# Patient Record
Sex: FEMALE | Race: WHITE | NOT HISPANIC OR LATINO | Employment: FULL TIME | URBAN - METROPOLITAN AREA
[De-identification: names, ages, dates, MRNs, and addresses within clinical notes are randomized per-mention and may not be internally consistent; named-entity substitution may affect disease eponyms.]

---

## 2017-10-25 ENCOUNTER — HOSPITAL ENCOUNTER (OUTPATIENT)
Dept: FAMILY MEDICINE CLINIC | Facility: CLINIC | Age: 57
Setting detail: SPECIMEN
Discharge: HOME | End: 2017-10-25
Attending: FAMILY MEDICINE | Admitting: FAMILY MEDICINE

## 2017-10-25 LAB
ALBUMIN SERPL-MCNC: 4.3 G/DL (ref 3.5–4.8)
ALBUMIN/GLOB SERPL: 1.5 {RATIO} (ref 1–1.7)
ALP SERPL-CCNC: 112 IU/L (ref 32–91)
ALT SERPL-CCNC: 45 IU/L (ref 14–54)
ANION GAP SERPL CALC-SCNC: 12.8 MMOL/L (ref 10–20)
AST SERPL-CCNC: 55 IU/L (ref 15–41)
BILIRUB SERPL-MCNC: 0.4 MG/DL (ref 0.3–1.2)
BUN SERPL-MCNC: 11 MG/DL (ref 8–20)
BUN/CREAT SERPL: 15.7 (ref 5.4–26.2)
CALCIUM SERPL-MCNC: 9.7 MG/DL (ref 8.9–10.3)
CHLORIDE SERPL-SCNC: 103 MMOL/L (ref 101–111)
CHOLEST SERPL-MCNC: 149 MG/DL
CHOLEST/HDLC SERPL: 3.2 {RATIO}
CONV CO2: 27 MMOL/L (ref 22–32)
CONV LDL CHOLESTEROL DIRECT: 87 MG/DL (ref 0–100)
CONV TOTAL PROTEIN: 7.2 G/DL (ref 6.1–7.9)
CREAT UR-MCNC: 0.7 MG/DL (ref 0.4–1)
GLOBULIN UR ELPH-MCNC: 2.9 G/DL (ref 2.5–3.8)
GLUCOSE SERPL-MCNC: 120 MG/DL (ref 65–99)
HDLC SERPL-MCNC: 47 MG/DL
LDLC/HDLC SERPL: 1.8 {RATIO}
LIPID INTERPRETATION: NORMAL
POTASSIUM SERPL-SCNC: 4.8 MMOL/L (ref 3.6–5.1)
SODIUM SERPL-SCNC: 138 MMOL/L (ref 136–144)
TRIGL SERPL-MCNC: 98 MG/DL
TSH SERPL-ACNC: 2.46 UIU/ML (ref 0.34–5.6)
VLDLC SERPL CALC-MCNC: 15.7 MG/DL

## 2018-04-25 ENCOUNTER — HOSPITAL ENCOUNTER (OUTPATIENT)
Dept: FAMILY MEDICINE CLINIC | Facility: CLINIC | Age: 58
Setting detail: SPECIMEN
Discharge: HOME | End: 2018-04-25
Attending: FAMILY MEDICINE | Admitting: FAMILY MEDICINE

## 2018-04-25 LAB
ALBUMIN SERPL-MCNC: 4.4 G/DL (ref 3.5–4.8)
ALBUMIN/GLOB SERPL: 1.6 {RATIO} (ref 1–1.7)
ALP SERPL-CCNC: 95 IU/L (ref 32–91)
ALT SERPL-CCNC: 26 IU/L (ref 14–54)
ANION GAP SERPL CALC-SCNC: 12.7 MMOL/L (ref 10–20)
AST SERPL-CCNC: 32 IU/L (ref 15–41)
BILIRUB SERPL-MCNC: 0.4 MG/DL (ref 0.3–1.2)
BUN SERPL-MCNC: 13 MG/DL (ref 8–20)
BUN/CREAT SERPL: 14.4 (ref 5.4–26.2)
CALCIUM SERPL-MCNC: 10.2 MG/DL (ref 8.9–10.3)
CHLORIDE SERPL-SCNC: 106 MMOL/L (ref 101–111)
CHOLEST SERPL-MCNC: 197 MG/DL
CHOLEST/HDLC SERPL: 3.9 {RATIO}
CONV CO2: 27 MMOL/L (ref 22–32)
CONV LDL CHOLESTEROL DIRECT: 120 MG/DL (ref 0–100)
CONV TOTAL PROTEIN: 7.2 G/DL (ref 6.1–7.9)
CREAT UR-MCNC: 0.9 MG/DL (ref 0.4–1)
GLOBULIN UR ELPH-MCNC: 2.8 G/DL (ref 2.5–3.8)
GLUCOSE SERPL-MCNC: 111 MG/DL (ref 65–99)
HDLC SERPL-MCNC: 51 MG/DL
LDLC/HDLC SERPL: 2.4 {RATIO}
LIPID INTERPRETATION: ABNORMAL
POTASSIUM SERPL-SCNC: 4.7 MMOL/L (ref 3.6–5.1)
SODIUM SERPL-SCNC: 141 MMOL/L (ref 136–144)
TRIGL SERPL-MCNC: 123 MG/DL
VLDLC SERPL CALC-MCNC: 26.6 MG/DL

## 2019-03-27 ENCOUNTER — HOSPITAL ENCOUNTER (OUTPATIENT)
Dept: FAMILY MEDICINE CLINIC | Facility: CLINIC | Age: 59
Setting detail: SPECIMEN
Discharge: HOME | End: 2019-03-27
Attending: FAMILY MEDICINE | Admitting: FAMILY MEDICINE

## 2019-03-27 LAB
ALBUMIN SERPL-MCNC: 4.2 G/DL (ref 3.5–4.8)
ALBUMIN/GLOB SERPL: 1.4 {RATIO} (ref 1–1.7)
ALP SERPL-CCNC: 79 IU/L (ref 32–91)
ALT SERPL-CCNC: 31 IU/L (ref 14–54)
ANION GAP SERPL CALC-SCNC: 15 MMOL/L (ref 10–20)
AST SERPL-CCNC: 30 IU/L (ref 15–41)
BILIRUB SERPL-MCNC: 0.7 MG/DL (ref 0.3–1.2)
BUN SERPL-MCNC: 15 MG/DL (ref 8–20)
BUN/CREAT SERPL: 18.8 (ref 5.4–26.2)
CALCIUM SERPL-MCNC: 9.5 MG/DL (ref 8.9–10.3)
CARBAMAZEPINE SERPL-MCNC: 6.6 UG/ML (ref 4–12)
CHLORIDE SERPL-SCNC: 105 MMOL/L (ref 101–111)
CHOLEST SERPL-MCNC: 198 MG/DL
CHOLEST/HDLC SERPL: 4.7 {RATIO}
CONV CO2: 24 MMOL/L (ref 22–32)
CONV LDL CHOLESTEROL DIRECT: 136 MG/DL (ref 0–100)
CONV TOTAL PROTEIN: 7.2 G/DL (ref 6.1–7.9)
CREAT UR-MCNC: 0.8 MG/DL (ref 0.4–1)
GLOBULIN UR ELPH-MCNC: 3 G/DL (ref 2.5–3.8)
GLUCOSE SERPL-MCNC: 147 MG/DL (ref 65–99)
HDLC SERPL-MCNC: 42 MG/DL
LDLC/HDLC SERPL: 3.3 {RATIO}
LIPID INTERPRETATION: ABNORMAL
POTASSIUM SERPL-SCNC: 4 MMOL/L (ref 3.6–5.1)
SODIUM SERPL-SCNC: 140 MMOL/L (ref 136–144)
TRIGL SERPL-MCNC: 198 MG/DL
VLDLC SERPL CALC-MCNC: 19.8 MG/DL

## 2019-08-12 ENCOUNTER — OFFICE VISIT (OUTPATIENT)
Dept: FAMILY MEDICINE CLINIC | Facility: CLINIC | Age: 59
End: 2019-08-12

## 2019-08-12 VITALS
DIASTOLIC BLOOD PRESSURE: 62 MMHG | HEIGHT: 60 IN | TEMPERATURE: 98.3 F | BODY MASS INDEX: 47.27 KG/M2 | WEIGHT: 240.8 LBS | OXYGEN SATURATION: 94 % | RESPIRATION RATE: 16 BRPM | HEART RATE: 77 BPM | SYSTOLIC BLOOD PRESSURE: 130 MMHG

## 2019-08-12 DIAGNOSIS — E78.49 OTHER HYPERLIPIDEMIA: ICD-10-CM

## 2019-08-12 DIAGNOSIS — E03.8 OTHER SPECIFIED HYPOTHYROIDISM: Primary | ICD-10-CM

## 2019-08-12 PROBLEM — F31.9 BIPOLAR AFFECTIVE DISORDER (HCC): Status: ACTIVE | Noted: 2019-08-12

## 2019-08-12 PROBLEM — F41.9 ANXIETY DISORDER: Status: ACTIVE | Noted: 2019-08-12

## 2019-08-12 PROBLEM — G47.30 SLEEP APNEA: Status: ACTIVE | Noted: 2019-08-12

## 2019-08-12 PROBLEM — E03.9 HYPOTHYROIDISM: Status: ACTIVE | Noted: 2019-08-12

## 2019-08-12 PROCEDURE — 99214 OFFICE O/P EST MOD 30 MIN: CPT | Performed by: NURSE PRACTITIONER

## 2019-08-12 RX ORDER — LEVOTHYROXINE SODIUM 175 UG/1
175 TABLET ORAL DAILY
Refills: 0 | COMMUNITY
Start: 2019-06-14 | End: 2019-08-12

## 2019-08-12 RX ORDER — GABAPENTIN 600 MG/1
600 TABLET ORAL 2 TIMES DAILY
Refills: 0 | COMMUNITY
Start: 2019-06-09

## 2019-08-12 RX ORDER — BUSPIRONE HYDROCHLORIDE 15 MG/1
15 TABLET ORAL 4 TIMES DAILY
COMMUNITY
Start: 2019-07-30

## 2019-08-12 RX ORDER — CARBAMAZEPINE 200 MG/1
200 TABLET ORAL 4 TIMES DAILY
COMMUNITY
Start: 2019-07-30

## 2019-08-12 RX ORDER — NAPROXEN SODIUM 220 MG
TABLET ORAL
COMMUNITY
Start: 2016-12-09

## 2019-08-12 RX ORDER — FENOFIBRATE 160 MG/1
160 TABLET ORAL
Refills: 2 | COMMUNITY
Start: 2019-07-15 | End: 2020-03-31

## 2019-08-12 RX ORDER — LEVOTHYROXINE SODIUM 0.2 MG/1
200 TABLET ORAL DAILY
Qty: 30 TABLET | Refills: 3 | Status: SHIPPED | OUTPATIENT
Start: 2019-08-12 | End: 2019-09-26 | Stop reason: SDUPTHER

## 2019-08-12 NOTE — PROGRESS NOTES
"Subjective   Mary Grace Walsh is a 58 y.o. female.     Chief Complaint   Patient presents with   • Annual Exam   • Establish Care       /62 (BP Location: Left arm, Patient Position: Sitting, Cuff Size: Large Adult)   Pulse 77   Temp 98.3 °F (36.8 °C) (Oral)   Resp 16   Ht 152.4 cm (60\")   Wt 109 kg (240 lb 12.8 oz)   SpO2 94%   BMI 47.03 kg/m²     New pt needs to get est.   Used to see Dr. Tineo. Says they didn't get along.   No specific concerns today. Does have hx of bipolar d/o. Sees Dr. Culver.   Having increased fatigue. Not sure if it is her depression, JOHANNE, or thyroid.   Her recent TSH was slightly elevated. She is currenlty on synthroid 175mcg.   Pt is past due for mammogram, pap, colonoscopy. She would like to wait until she gets back from her upcoming vacation.          Past Surgical History:   Procedure Laterality Date   • CHOLECYSTECTOMY     • REPLACEMENT TOTAL KNEE BILATERAL Bilateral     has had a total of 6 knee surgeries        Family History   Problem Relation Age of Onset   • Stroke Mother    • Hypothyroidism Mother    • Hypertension Father    • Hypothyroidism Father    • Heart failure Father    • Diabetes Father    • Stroke Father    • Melanoma Father    • No Known Problems Sister    • No Known Problems Maternal Grandmother    • No Known Problems Maternal Grandfather    • No Known Problems Paternal Grandmother    • Heart failure Paternal Grandfather         ?    • No Known Problems Sister        Social History     Socioeconomic History   • Marital status:      Spouse name: Not on file   • Number of children: 0   • Years of education: Not on file   • Highest education level: Not on file   Occupational History     Employer: [employer]   Tobacco Use   • Smoking status: Former Smoker     Years: 30.00     Types: Cigarettes   • Smokeless tobacco: Never Used   Substance and Sexual Activity   • Alcohol use: Yes     Frequency: Monthly or less     Comment: socially "   • Drug use: No   • Sexual activity: Yes     Partners: Male   Social History Narrative    Hobbies: jewerly making, sewing, crafts, agus, coloring, reading        The following portions of the patient's history were reviewed and updated as appropriate: allergies, current medications, past family history, past medical history, past social history, past surgical history and problem list.    Review of Systems   Constitutional: Positive for fatigue.   Eyes: Negative for blurred vision.   Respiratory: Negative for shortness of breath.    Cardiovascular: Negative for chest pain and palpitations.   Neurological: Negative for dizziness and headache.       Objective   Physical Exam   Constitutional: She is oriented to person, place, and time. She appears well-developed and well-nourished.   Eyes: Pupils are equal, round, and reactive to light.   Cardiovascular: Normal rate and regular rhythm.   Pulmonary/Chest: Effort normal and breath sounds normal.   Neurological: She is alert and oriented to person, place, and time.         Assessment/Plan   Mary Grace was seen today for annual exam and establish care.    Diagnoses and all orders for this visit:    Other specified hypothyroidism  Comments:  Increase synthroid. Will repeat TSH in 2-3 months.   Orders:  -     levothyroxine (SYNTHROID) 200 MCG tablet; Take 1 tablet by mouth Daily.  -     TSH; Future    Other hyperlipidemia  Comments:  Will repeat labs in 3 months     Discussed importance of regular exercise and recommended starting or continuing a regular exercise program for good health. The patient was also encouraged to lose weight for better health.   During this office visit, we discussed the pertinent aspects of the visit and treatment recommendations. Pt verbalizes understanding. Follow up was discussed. Patient was given the opportunity to ask questions and discuss other concerns.

## 2019-09-26 DIAGNOSIS — E03.8 OTHER SPECIFIED HYPOTHYROIDISM: ICD-10-CM

## 2019-09-26 RX ORDER — LEVOTHYROXINE SODIUM 0.2 MG/1
TABLET ORAL
Qty: 30 TABLET | Refills: 3 | Status: SHIPPED | OUTPATIENT
Start: 2019-09-26 | End: 2020-01-06

## 2019-09-30 ENCOUNTER — OFFICE VISIT (OUTPATIENT)
Dept: FAMILY MEDICINE CLINIC | Facility: CLINIC | Age: 59
End: 2019-09-30

## 2019-09-30 ENCOUNTER — LAB (OUTPATIENT)
Dept: FAMILY MEDICINE CLINIC | Facility: CLINIC | Age: 59
End: 2019-09-30

## 2019-09-30 VITALS
RESPIRATION RATE: 14 BRPM | BODY MASS INDEX: 46.65 KG/M2 | WEIGHT: 237.6 LBS | HEART RATE: 71 BPM | HEIGHT: 60 IN | TEMPERATURE: 98.5 F | OXYGEN SATURATION: 96 % | DIASTOLIC BLOOD PRESSURE: 82 MMHG | SYSTOLIC BLOOD PRESSURE: 168 MMHG

## 2019-09-30 DIAGNOSIS — Z12.39 SCREENING FOR BREAST CANCER: ICD-10-CM

## 2019-09-30 DIAGNOSIS — Z01.419 ENCOUNTER FOR GYNECOLOGICAL EXAMINATION WITHOUT ABNORMAL FINDING: Primary | ICD-10-CM

## 2019-09-30 DIAGNOSIS — E03.8 OTHER SPECIFIED HYPOTHYROIDISM: ICD-10-CM

## 2019-09-30 DIAGNOSIS — Z23 IMMUNIZATION DUE: ICD-10-CM

## 2019-09-30 DIAGNOSIS — R03.0 ELEVATED BLOOD PRESSURE READING IN OFFICE WITHOUT DIAGNOSIS OF HYPERTENSION: ICD-10-CM

## 2019-09-30 DIAGNOSIS — E78.49 OTHER HYPERLIPIDEMIA: ICD-10-CM

## 2019-09-30 DIAGNOSIS — Z12.11 SCREENING FOR COLON CANCER: ICD-10-CM

## 2019-09-30 LAB
ALBUMIN SERPL-MCNC: 4.2 G/DL (ref 3.5–4.8)
ALBUMIN/GLOB SERPL: 1.4 G/DL (ref 1–1.7)
ALP SERPL-CCNC: 72 U/L (ref 32–91)
ALT SERPL W P-5'-P-CCNC: 31 U/L (ref 14–54)
ANION GAP SERPL CALCULATED.3IONS-SCNC: 12.5 MMOL/L (ref 5–15)
ARTICHOKE IGE QN: 140 MG/DL (ref 0–100)
AST SERPL-CCNC: 31 U/L (ref 15–41)
BILIRUB SERPL-MCNC: 0.4 MG/DL (ref 0.3–1.2)
BUN BLD-MCNC: 11 MG/DL (ref 8–20)
BUN/CREAT SERPL: 13.8 (ref 5.4–26.2)
CALCIUM SPEC-SCNC: 9.7 MG/DL (ref 8.9–10.3)
CHLORIDE SERPL-SCNC: 105 MMOL/L (ref 101–111)
CHOLEST SERPL-MCNC: 190 MG/DL
CO2 SERPL-SCNC: 27 MMOL/L (ref 22–32)
CREAT BLD-MCNC: 0.8 MG/DL (ref 0.4–1)
GFR SERPL CREATININE-BSD FRML MDRD: 73 ML/MIN/1.73
GLOBULIN UR ELPH-MCNC: 3.1 GM/DL (ref 2.5–3.8)
GLUCOSE BLD-MCNC: 116 MG/DL (ref 65–99)
HDLC SERPL QL: 4.32
HDLC SERPL-MCNC: 44 MG/DL
LDLC/HDLC SERPL: 2.7 {RATIO}
POTASSIUM BLD-SCNC: 4.5 MMOL/L (ref 3.6–5.1)
PROT SERPL-MCNC: 7.3 G/DL (ref 6.1–7.9)
SODIUM BLD-SCNC: 140 MMOL/L (ref 136–144)
TRIGL SERPL-MCNC: 135 MG/DL
TSH SERPL DL<=0.05 MIU/L-ACNC: 2.35 UIU/ML (ref 0.34–5.6)
VLDLC SERPL-MCNC: 27 MG/DL

## 2019-09-30 PROCEDURE — 90471 IMMUNIZATION ADMIN: CPT | Performed by: NURSE PRACTITIONER

## 2019-09-30 PROCEDURE — G0148 SCR C/V CYTO, AUTOSYS, RESCR: HCPCS

## 2019-09-30 PROCEDURE — 87624 HPV HI-RISK TYP POOLED RSLT: CPT | Performed by: NURSE PRACTITIONER

## 2019-09-30 PROCEDURE — 90674 CCIIV4 VAC NO PRSV 0.5 ML IM: CPT | Performed by: NURSE PRACTITIONER

## 2019-09-30 PROCEDURE — 80053 COMPREHEN METABOLIC PANEL: CPT | Performed by: NURSE PRACTITIONER

## 2019-09-30 PROCEDURE — 80061 LIPID PANEL: CPT | Performed by: NURSE PRACTITIONER

## 2019-09-30 PROCEDURE — 90715 TDAP VACCINE 7 YRS/> IM: CPT | Performed by: NURSE PRACTITIONER

## 2019-09-30 PROCEDURE — 99213 OFFICE O/P EST LOW 20 MIN: CPT | Performed by: NURSE PRACTITIONER

## 2019-09-30 PROCEDURE — 84443 ASSAY THYROID STIM HORMONE: CPT | Performed by: NURSE PRACTITIONER

## 2019-09-30 PROCEDURE — 99396 PREV VISIT EST AGE 40-64: CPT | Performed by: NURSE PRACTITIONER

## 2019-09-30 PROCEDURE — 90472 IMMUNIZATION ADMIN EACH ADD: CPT | Performed by: NURSE PRACTITIONER

## 2019-09-30 RX ORDER — ARIPIPRAZOLE 5 MG/1
TABLET ORAL
COMMUNITY
Start: 2019-09-26

## 2019-09-30 NOTE — PROGRESS NOTES
"Clayton Walsh is a 58 y.o. female.     Chief Complaint   Patient presents with   • Gynecologic Exam       /82 (BP Location: Left arm, Patient Position: Sitting, Cuff Size: Large Adult)   Pulse 71   Temp 98.5 °F (36.9 °C) (Oral)   Resp 14   Ht 152.4 cm (60\")   Wt 108 kg (237 lb 9.6 oz)   SpO2 96%   BMI 46.40 kg/m²     BP Readings from Last 3 Encounters:   09/30/19 168/82   08/12/19 130/62   03/27/19 143/84       Wt Readings from Last 3 Encounters:   09/30/19 108 kg (237 lb 9.6 oz)   08/12/19 109 kg (240 lb 12.8 oz)   03/27/19 109 kg (239 lb 16 oz)       Pt comes in today for pap. Not sure when her last pap was done. Has been \"years\". Not sure when her LMP was. Sometime in her 40s she thinks.  Has a new baby in the family and requesting Tdap. Also would like FLU today.  Due for mammogram.        The following portions of the patient's history were reviewed and updated as appropriate: allergies, current medications, past family history, past medical history, past social history, past surgical history and problem list.    Review of Systems   Constitutional: Negative for fatigue.   Eyes: Negative for blurred vision.   Respiratory: Negative for shortness of breath.    Cardiovascular: Negative for chest pain and palpitations.   Neurological: Negative for dizziness and headache.       Objective   Physical Exam   Constitutional: She is oriented to person, place, and time. She appears well-developed and well-nourished.   Eyes: Pupils are equal, round, and reactive to light.   Cardiovascular: Normal rate and regular rhythm.   Pulmonary/Chest: Effort normal and breath sounds normal.   Genitourinary: Vagina normal. Pelvic exam was performed with patient supine. Uterus is deviated.   Neurological: She is alert and oriented to person, place, and time.         Diagnoses and all orders for this visit:    1. Encounter for gynecological examination without abnormal finding (Primary)  -     PapIG HPV Age Gdln " ACOG; Future  -     PapIG HPV Age Gdln ACOG    2. Elevated blood pressure reading in office without diagnosis of hypertension  Comments:  Repeat BP was 160/78.   She thinks it is elevated 2/2 getting pap done today. Will monitor and follow up in 1 month for BP check and repeat labs     3. Screening for breast cancer  -     Mammo Screening Digital Tomosynthesis Bilateral With CAD; Future    4. Screening for colon cancer  Comments:  Pt refuses colonoscopy, but agrees to cologuard.   Orders:  -     Cologuard - Stool, Per Rectum; Future    5. Other hyperlipidemia  Comments:  repeat labs today. Cont to work on lifestyle changes including diet, weight loss, and exercise.   Orders:  -     Comprehensive Metabolic Panel; Future  -     Lipid Panel; Future    6. Other specified hypothyroidism  Comments:  repeat labs today   Orders:  -     TSH; Future    7. Immunization due  -     Tdap Vaccine Greater Than or Equal To 6yo IM  -     Flucelvax Quad=>4Years ([id number])    During this office visit, we discussed the pertinent aspects of the visit and treatment recommendations. Pt verbalizes understanding. Follow up was discussed. Patient was given the opportunity to ask questions and discuss other concerns.       Return in about 1 month (around 10/30/2019) for BP check only .

## 2019-10-03 LAB
AGE GDLN ACOG TESTING: NORMAL
CHROM ANALY OVERALL INTERP-IMP: NORMAL
CONV .: NORMAL
CONV PERFORMED BY:: NORMAL
DX ICD CODE: NORMAL
HIV 1 & 2 AB SER-IMP: NORMAL
HPV I/H RISK 1 DNA CVX QL PROBE+SIG AMP: NEGATIVE
REF LAB TEST METHOD: NORMAL
STAT OF ADQ CVX/VAG CYTO-IMP: NORMAL

## 2019-10-11 DIAGNOSIS — Z12.11 SCREENING FOR COLON CANCER: ICD-10-CM

## 2019-10-17 DIAGNOSIS — Z12.39 SCREENING FOR BREAST CANCER: ICD-10-CM

## 2019-10-28 ENCOUNTER — CLINICAL SUPPORT (OUTPATIENT)
Dept: FAMILY MEDICINE CLINIC | Facility: CLINIC | Age: 59
End: 2019-10-28

## 2019-10-28 VITALS — SYSTOLIC BLOOD PRESSURE: 150 MMHG | DIASTOLIC BLOOD PRESSURE: 84 MMHG

## 2019-10-28 DIAGNOSIS — Z23 IMMUNIZATION DUE: ICD-10-CM

## 2019-10-28 DIAGNOSIS — R03.0 ELEVATED BLOOD PRESSURE READING IN OFFICE WITHOUT DIAGNOSIS OF HYPERTENSION: Primary | ICD-10-CM

## 2019-10-28 PROCEDURE — 90632 HEPA VACCINE ADULT IM: CPT | Performed by: NURSE PRACTITIONER

## 2019-10-28 PROCEDURE — 90471 IMMUNIZATION ADMIN: CPT | Performed by: NURSE PRACTITIONER

## 2020-01-06 DIAGNOSIS — E03.8 OTHER SPECIFIED HYPOTHYROIDISM: ICD-10-CM

## 2020-01-06 RX ORDER — LEVOTHYROXINE SODIUM 0.2 MG/1
TABLET ORAL
Qty: 90 TABLET | Refills: 1 | Status: SHIPPED | OUTPATIENT
Start: 2020-01-06 | End: 2020-06-29

## 2020-02-03 ENCOUNTER — TELEPHONE (OUTPATIENT)
Dept: FAMILY MEDICINE CLINIC | Facility: CLINIC | Age: 60
End: 2020-02-03

## 2020-02-03 NOTE — TELEPHONE ENCOUNTER
VM MESSAGE.  Pt states she was at [facility] today and her prescription changed from December to now. She is asking if she needs to get her BS checked here. Thank you.

## 2020-02-04 ENCOUNTER — LAB (OUTPATIENT)
Dept: FAMILY MEDICINE CLINIC | Facility: CLINIC | Age: 60
End: 2020-02-04

## 2020-02-04 DIAGNOSIS — R73.09 ELEVATED GLUCOSE: Primary | ICD-10-CM

## 2020-02-04 DIAGNOSIS — E78.49 OTHER HYPERLIPIDEMIA: ICD-10-CM

## 2020-02-04 LAB
BILIRUB UR QL STRIP: NEGATIVE
CLARITY UR: CLEAR
COLOR UR: YELLOW
GLUCOSE UR STRIP-MCNC: ABNORMAL MG/DL
HBA1C MFR BLD: 9.2 % (ref 3.5–5.6)
HGB UR QL STRIP.AUTO: NEGATIVE
KETONES UR QL STRIP: ABNORMAL
LEUKOCYTE ESTERASE UR QL STRIP.AUTO: NEGATIVE
NITRITE UR QL STRIP: NEGATIVE
PH UR STRIP.AUTO: 6 [PH] (ref 5–8)
PROT UR QL STRIP: ABNORMAL
SP GR UR STRIP: >=1.03 (ref 1–1.03)
UROBILINOGEN UR QL STRIP: ABNORMAL

## 2020-02-04 PROCEDURE — 81003 URINALYSIS AUTO W/O SCOPE: CPT | Performed by: NURSE PRACTITIONER

## 2020-02-04 PROCEDURE — 80053 COMPREHEN METABOLIC PANEL: CPT | Performed by: NURSE PRACTITIONER

## 2020-02-04 PROCEDURE — 83036 HEMOGLOBIN GLYCOSYLATED A1C: CPT | Performed by: NURSE PRACTITIONER

## 2020-02-04 PROCEDURE — 36415 COLL VENOUS BLD VENIPUNCTURE: CPT

## 2020-02-05 ENCOUNTER — OFFICE VISIT (OUTPATIENT)
Dept: FAMILY MEDICINE CLINIC | Facility: CLINIC | Age: 60
End: 2020-02-05

## 2020-02-05 ENCOUNTER — TELEPHONE (OUTPATIENT)
Dept: FAMILY MEDICINE CLINIC | Facility: CLINIC | Age: 60
End: 2020-02-05

## 2020-02-05 VITALS
RESPIRATION RATE: 8 BRPM | DIASTOLIC BLOOD PRESSURE: 84 MMHG | HEART RATE: 105 BPM | OXYGEN SATURATION: 93 % | BODY MASS INDEX: 44.76 KG/M2 | SYSTOLIC BLOOD PRESSURE: 138 MMHG | TEMPERATURE: 98.9 F | HEIGHT: 60 IN | WEIGHT: 228 LBS

## 2020-02-05 DIAGNOSIS — R73.09 ELEVATED GLUCOSE: Primary | ICD-10-CM

## 2020-02-05 DIAGNOSIS — E11.65 TYPE 2 DIABETES MELLITUS WITH HYPERGLYCEMIA, WITHOUT LONG-TERM CURRENT USE OF INSULIN (HCC): Primary | ICD-10-CM

## 2020-02-05 LAB
ALBUMIN SERPL-MCNC: 4.7 G/DL (ref 3.5–5.2)
ALBUMIN/GLOB SERPL: 2 G/DL
ALP SERPL-CCNC: 118 U/L (ref 39–117)
ALT SERPL W P-5'-P-CCNC: 24 U/L (ref 1–33)
ANION GAP SERPL CALCULATED.3IONS-SCNC: 14.7 MMOL/L (ref 5–15)
AST SERPL-CCNC: 19 U/L (ref 1–32)
BILIRUB SERPL-MCNC: 0.2 MG/DL (ref 0.2–1.2)
BUN BLD-MCNC: 19 MG/DL (ref 8–23)
BUN/CREAT SERPL: 17.8 (ref 7–25)
CALCIUM SPEC-SCNC: 10.3 MG/DL (ref 8.6–10.5)
CHLORIDE SERPL-SCNC: 89 MMOL/L (ref 98–107)
CO2 SERPL-SCNC: 27.3 MMOL/L (ref 22–29)
CREAT BLD-MCNC: 1.07 MG/DL (ref 0.57–1)
GFR SERPL CREATININE-BSD FRML MDRD: 52 ML/MIN/1.73
GLOBULIN UR ELPH-MCNC: 2.4 GM/DL
GLUCOSE BLD-MCNC: 444 MG/DL (ref 65–99)
POTASSIUM BLD-SCNC: 4.7 MMOL/L (ref 3.5–5.2)
PROT SERPL-MCNC: 7.1 G/DL (ref 6–8.5)
SODIUM BLD-SCNC: 131 MMOL/L (ref 136–145)

## 2020-02-05 PROCEDURE — 99214 OFFICE O/P EST MOD 30 MIN: CPT | Performed by: NURSE PRACTITIONER

## 2020-02-05 RX ORDER — EXENATIDE 2 MG/.85ML
2 INJECTION, SUSPENSION, EXTENDED RELEASE SUBCUTANEOUS WEEKLY
Qty: 12 PEN | Refills: 3 | Status: SHIPPED | OUTPATIENT
Start: 2020-02-05 | End: 2020-08-17 | Stop reason: SDUPTHER

## 2020-02-05 NOTE — TELEPHONE ENCOUNTER
BS was 444 and A1c was 9.2. She is diabetic and this needs to be treated aggressively. Needs to be seen today or tomorrow.   I have an opening at 3 today  Needs referral to endo ASAP as well.

## 2020-02-05 NOTE — PROGRESS NOTES
"Subjective   Mary Grace Walsh is a 59 y.o. female.     Chief Complaint   Patient presents with   • Blood Sugar Problem       /84 (BP Location: Right arm, Patient Position: Sitting, Cuff Size: Large Adult)   Pulse 105   Temp 98.9 °F (37.2 °C) (Oral)   Resp 8   Ht 152.4 cm (60\")   Wt 103 kg (228 lb)   SpO2 93%   BMI 44.53 kg/m²     BP Readings from Last 3 Encounters:   02/05/20 138/84   10/28/19 150/84   09/30/19 168/82       Wt Readings from Last 3 Encounters:   02/05/20 103 kg (228 lb)   09/30/19 108 kg (237 lb 9.6 oz)   08/12/19 109 kg (240 lb 12.8 oz)       Pt comes in today for follow up on recent labs. Had labs done 2 days ago and her BS was 444, and A1C was 9.2.   Has been having blurry vision. Had glasses changed recently. Has had incresaed thirst and urination.   She does mention that she has been eating more sweets lately.   Father has DM.   Feels fatigued and has felt bad lately.        The following portions of the patient's history were reviewed and updated as appropriate: allergies, current medications, past family history, past medical history, past social history, past surgical history and problem list.    Review of Systems   Constitutional: Negative for fatigue.   Eyes: Positive for blurred vision.   Respiratory: Negative for shortness of breath.    Cardiovascular: Negative for chest pain and palpitations.   Endocrine: Positive for polydipsia and polyuria.   Neurological: Negative for dizziness and headache.       Objective   Physical Exam   Constitutional: She is oriented to person, place, and time. She appears well-developed and well-nourished.   Eyes: Pupils are equal, round, and reactive to light.   Cardiovascular: Normal rate and regular rhythm.   Pulmonary/Chest: Effort normal and breath sounds normal.   Neurological: She is alert and oriented to person, place, and time.   Psychiatric: Her mood appears anxious.         Diagnoses and all orders for this visit:    1. Type 2 diabetes " mellitus with hyperglycemia, without long-term current use of insulin (CMS/[facility]) (Primary)  Assessment & Plan:  Diabetes is newly identified.   Reminded to bring in blood sugar diary at next visit.  Dietary recommendations for ADA diet.  Regular aerobic exercise.  Discussed ways to avoid symptomatic hypoglycemia.  Discussed foot care.  Reminded to get yearly retinal exam.  Medication changes per orders.  Diabetes educator referral.  Diabetes will be reassessed in 3 months.    Orders:  -     metFORMIN (GLUCOPHAGE) 500 MG tablet; Take 1 tablet by mouth 2 (Two) Times a Day With Meals for 60 days.  Dispense: 180 tablet; Refill: 1  -     BYDUREON BCISE 2 MG/0.85ML auto-injector injection; Inject 0.85 mL under the skin into the appropriate area as directed 1 (One) Time Per Week.  Dispense: 12 pen; Refill: 3  -     Ambulatory Referral to Endocrinology  The importance of monitoring blood sugar regularly was reviewed.  The importance of monitoring the HgBA1C level regularly was reviewed.  The importance of monitoring urine microalbumin regularly to check for kidney damage was reviewed.   The importance of annual eye exams to prevent blindness was reviewed.  The importance of proper foot care and regularly checking feet to prevent sores and possibly loss of limbs was reviewed.   During this office visit, we discussed the pertinent aspects of the visit and treatment recommendations. Pt verbalizes understanding. Follow up was discussed. Patient was given the opportunity to ask questions and discuss other concerns.       Return if symptoms worsen or fail to improve.

## 2020-02-05 NOTE — ASSESSMENT & PLAN NOTE
Diabetes is newly identified.   Reminded to bring in blood sugar diary at next visit.  Dietary recommendations for ADA diet.  Regular aerobic exercise.  Discussed ways to avoid symptomatic hypoglycemia.  Discussed foot care.  Reminded to get yearly retinal exam.  Medication changes per orders.  Diabetes educator referral.  Diabetes will be reassessed in 3 months.

## 2020-02-05 NOTE — TELEPHONE ENCOUNTER
Patient left a voice message at 10:33am.  Said she is having severe dryness in her mouth and her eye sight is out of focus and she is lethargic.  She went to the eye doctor and they told her to call you.  Patient would like to be called with her labwork results and if any of the results would be a cause of her symptoms.

## 2020-02-10 ENCOUNTER — TELEPHONE (OUTPATIENT)
Dept: FAMILY MEDICINE CLINIC | Facility: CLINIC | Age: 60
End: 2020-02-10

## 2020-02-25 ENCOUNTER — TELEPHONE (OUTPATIENT)
Dept: ENDOCRINOLOGY | Facility: CLINIC | Age: 60
End: 2020-02-25

## 2020-02-25 NOTE — TELEPHONE ENCOUNTER
SPOKE WITH PATIENT. GOT HER NP REFERRAL APPT SCHEDULED AS WELL AS HAVE HER SCHEDULED INTO DIABETIC EDUCATION CLASS

## 2020-03-16 ENCOUNTER — OFFICE VISIT (OUTPATIENT)
Dept: ENDOCRINOLOGY | Facility: CLINIC | Age: 60
End: 2020-03-16

## 2020-03-16 DIAGNOSIS — E11.65 TYPE 2 DIABETES MELLITUS WITH HYPERGLYCEMIA, WITHOUT LONG-TERM CURRENT USE OF INSULIN (HCC): ICD-10-CM

## 2020-03-16 PROCEDURE — G0108 DIAB MANAGE TRN  PER INDIV: HCPCS | Performed by: DIETITIAN, REGISTERED

## 2020-03-16 RX ORDER — LANCETS
EACH MISCELLANEOUS
Qty: 30 EACH | Refills: 1 | Status: SHIPPED | OUTPATIENT
Start: 2020-03-16

## 2020-03-16 RX ORDER — LANCETS
EACH MISCELLANEOUS
COMMUNITY
End: 2020-03-16 | Stop reason: SDUPTHER

## 2020-03-16 RX ORDER — BLOOD SUGAR DIAGNOSTIC
1 STRIP MISCELLANEOUS AS NEEDED
COMMUNITY
End: 2020-03-16

## 2020-03-16 RX ORDER — BLOOD SUGAR DIAGNOSTIC
STRIP MISCELLANEOUS
Qty: 30 EACH | Refills: 3 | Status: SHIPPED | OUTPATIENT
Start: 2020-03-16 | End: 2021-12-14

## 2020-03-31 RX ORDER — FENOFIBRATE 160 MG/1
TABLET ORAL
Qty: 90 TABLET | Refills: 2 | Status: SHIPPED | OUTPATIENT
Start: 2020-03-31 | End: 2020-12-17

## 2020-04-08 ENCOUNTER — TELEPHONE (OUTPATIENT)
Dept: FAMILY MEDICINE CLINIC | Facility: CLINIC | Age: 60
End: 2020-04-08

## 2020-04-08 NOTE — TELEPHONE ENCOUNTER
PT WAS JUST DIAGNOSED WITH DIABETES AND WORKS WITH MENTALLY CHALLENGED ADULTS AND WANTS TO KNOW IF SHE CAN GET A DOCTORS NOTE TO NOT GO TO WORK.    PT CALL BACK [phone]

## 2020-04-09 NOTE — TELEPHONE ENCOUNTER
Why would she not go to work?   There is no need for her not to work. As long as she is washing her hands and taking precautions she should be ok.

## 2020-05-12 ENCOUNTER — OFFICE VISIT (OUTPATIENT)
Dept: FAMILY MEDICINE CLINIC | Facility: CLINIC | Age: 60
End: 2020-05-12

## 2020-05-12 ENCOUNTER — LAB (OUTPATIENT)
Dept: FAMILY MEDICINE CLINIC | Facility: CLINIC | Age: 60
End: 2020-05-12

## 2020-05-12 VITALS
HEIGHT: 60 IN | SYSTOLIC BLOOD PRESSURE: 120 MMHG | OXYGEN SATURATION: 95 % | WEIGHT: 217 LBS | RESPIRATION RATE: 8 BRPM | TEMPERATURE: 98.4 F | DIASTOLIC BLOOD PRESSURE: 84 MMHG | HEART RATE: 84 BPM | BODY MASS INDEX: 42.6 KG/M2

## 2020-05-12 DIAGNOSIS — E78.49 OTHER HYPERLIPIDEMIA: ICD-10-CM

## 2020-05-12 DIAGNOSIS — E03.8 OTHER SPECIFIED HYPOTHYROIDISM: ICD-10-CM

## 2020-05-12 DIAGNOSIS — E11.65 TYPE 2 DIABETES MELLITUS WITH HYPERGLYCEMIA, WITHOUT LONG-TERM CURRENT USE OF INSULIN (HCC): ICD-10-CM

## 2020-05-12 DIAGNOSIS — Z23 IMMUNIZATION DUE: ICD-10-CM

## 2020-05-12 DIAGNOSIS — E11.65 TYPE 2 DIABETES MELLITUS WITH HYPERGLYCEMIA, WITHOUT LONG-TERM CURRENT USE OF INSULIN (HCC): Primary | ICD-10-CM

## 2020-05-12 DIAGNOSIS — F31.70 BIPOLAR AFFECTIVE DISORDER IN REMISSION (HCC): ICD-10-CM

## 2020-05-12 LAB
ALBUMIN SERPL-MCNC: 4.7 G/DL (ref 3.5–5.2)
ALBUMIN UR-MCNC: <1.2 MG/DL
ALBUMIN/GLOB SERPL: 1.4 G/DL
ALP SERPL-CCNC: 86 U/L (ref 39–117)
ALT SERPL W P-5'-P-CCNC: 30 U/L (ref 1–33)
ANION GAP SERPL CALCULATED.3IONS-SCNC: 13.6 MMOL/L (ref 5–15)
AST SERPL-CCNC: 28 U/L (ref 1–32)
BASOPHILS # BLD AUTO: 0.03 10*3/MM3 (ref 0–0.2)
BASOPHILS NFR BLD AUTO: 0.6 % (ref 0–1.5)
BILIRUB SERPL-MCNC: 0.3 MG/DL (ref 0.2–1.2)
BUN BLD-MCNC: 16 MG/DL (ref 8–23)
BUN/CREAT SERPL: 22.5 (ref 7–25)
CALCIUM SPEC-SCNC: 10.6 MG/DL (ref 8.6–10.5)
CHLORIDE SERPL-SCNC: 104 MMOL/L (ref 98–107)
CHOLEST SERPL-MCNC: 190 MG/DL (ref 0–200)
CO2 SERPL-SCNC: 25.4 MMOL/L (ref 22–29)
CREAT BLD-MCNC: 0.71 MG/DL (ref 0.57–1)
DEPRECATED RDW RBC AUTO: 37.9 FL (ref 37–54)
EOSINOPHIL # BLD AUTO: 0.08 10*3/MM3 (ref 0–0.4)
EOSINOPHIL NFR BLD AUTO: 1.6 % (ref 0.3–6.2)
ERYTHROCYTE [DISTWIDTH] IN BLOOD BY AUTOMATED COUNT: 12 % (ref 12.3–15.4)
GFR SERPL CREATININE-BSD FRML MDRD: 84 ML/MIN/1.73
GLOBULIN UR ELPH-MCNC: 3.3 GM/DL
GLUCOSE BLD-MCNC: 100 MG/DL (ref 65–99)
HCT VFR BLD AUTO: 42.2 % (ref 34–46.6)
HDLC SERPL-MCNC: 49 MG/DL (ref 40–60)
HGB BLD-MCNC: 14.1 G/DL (ref 12–15.9)
IMM GRANULOCYTES # BLD AUTO: 0.03 10*3/MM3 (ref 0–0.05)
IMM GRANULOCYTES NFR BLD AUTO: 0.6 % (ref 0–0.5)
LDLC SERPL CALC-MCNC: 101 MG/DL (ref 0–100)
LDLC/HDLC SERPL: 2.07 {RATIO}
LYMPHOCYTES # BLD AUTO: 1.74 10*3/MM3 (ref 0.7–3.1)
LYMPHOCYTES NFR BLD AUTO: 35.7 % (ref 19.6–45.3)
MCH RBC QN AUTO: 29 PG (ref 26.6–33)
MCHC RBC AUTO-ENTMCNC: 33.4 G/DL (ref 31.5–35.7)
MCV RBC AUTO: 86.8 FL (ref 79–97)
MONOCYTES # BLD AUTO: 0.43 10*3/MM3 (ref 0.1–0.9)
MONOCYTES NFR BLD AUTO: 8.8 % (ref 5–12)
NEUTROPHILS # BLD AUTO: 2.56 10*3/MM3 (ref 1.7–7)
NEUTROPHILS NFR BLD AUTO: 52.7 % (ref 42.7–76)
NRBC BLD AUTO-RTO: 0 /100 WBC (ref 0–0.2)
PLATELET # BLD AUTO: 277 10*3/MM3 (ref 140–450)
PMV BLD AUTO: 10.8 FL (ref 6–12)
POTASSIUM BLD-SCNC: 4.7 MMOL/L (ref 3.5–5.2)
PROT SERPL-MCNC: 8 G/DL (ref 6–8.5)
RBC # BLD AUTO: 4.86 10*6/MM3 (ref 3.77–5.28)
SODIUM BLD-SCNC: 143 MMOL/L (ref 136–145)
TRIGL SERPL-MCNC: 198 MG/DL (ref 0–150)
TSH SERPL DL<=0.05 MIU/L-ACNC: 0.45 UIU/ML (ref 0.27–4.2)
VLDLC SERPL-MCNC: 39.6 MG/DL (ref 5–40)
WBC NRBC COR # BLD: 4.87 10*3/MM3 (ref 3.4–10.8)

## 2020-05-12 PROCEDURE — 90471 IMMUNIZATION ADMIN: CPT | Performed by: NURSE PRACTITIONER

## 2020-05-12 PROCEDURE — 82043 UR ALBUMIN QUANTITATIVE: CPT | Performed by: NURSE PRACTITIONER

## 2020-05-12 PROCEDURE — 99214 OFFICE O/P EST MOD 30 MIN: CPT | Performed by: NURSE PRACTITIONER

## 2020-05-12 PROCEDURE — 80061 LIPID PANEL: CPT | Performed by: NURSE PRACTITIONER

## 2020-05-12 PROCEDURE — 83036 HEMOGLOBIN GLYCOSYLATED A1C: CPT | Performed by: NURSE PRACTITIONER

## 2020-05-12 PROCEDURE — 90632 HEPA VACCINE ADULT IM: CPT | Performed by: NURSE PRACTITIONER

## 2020-05-12 PROCEDURE — 85025 COMPLETE CBC W/AUTO DIFF WBC: CPT | Performed by: NURSE PRACTITIONER

## 2020-05-12 PROCEDURE — 80053 COMPREHEN METABOLIC PANEL: CPT | Performed by: NURSE PRACTITIONER

## 2020-05-12 PROCEDURE — 84443 ASSAY THYROID STIM HORMONE: CPT | Performed by: NURSE PRACTITIONER

## 2020-05-12 RX ORDER — METFORMIN HYDROCHLORIDE 750 MG/1
750 TABLET, EXTENDED RELEASE ORAL
Qty: 90 TABLET | Refills: 3 | Status: SHIPPED | OUTPATIENT
Start: 2020-05-12 | End: 2020-07-23 | Stop reason: ALTCHOICE

## 2020-05-12 RX ORDER — BLOOD-GLUCOSE METER
1 EACH MISCELLANEOUS TAKE AS DIRECTED
COMMUNITY
Start: 2020-02-05

## 2020-05-12 NOTE — PROGRESS NOTES
"Subjective   Mary Grace Walsh is a 59 y.o. female.     Chief Complaint   Patient presents with   • Follow-up     3 month       /84   Pulse 84   Temp 98.4 °F (36.9 °C) (Temporal)   Resp 8   Ht 152.4 cm (60\")   Wt 98.4 kg (217 lb)   SpO2 95%   BMI 42.38 kg/m²     BP Readings from Last 3 Encounters:   05/12/20 120/84   02/05/20 138/84   10/28/19 150/84       Wt Readings from Last 3 Encounters:   05/12/20 98.4 kg (217 lb)   02/05/20 103 kg (228 lb)   09/30/19 108 kg (237 lb 9.6 oz)       Pt comes in today for follow up on DM. At last appt we had diagnosed her with DM. We started metformin bid and bydrueon. BS is running 110-130s. She is working on diet. Has lost 9 pounds. We did refer her to [facility]. Went to 1 class and then rest were cancelled due to COVID.   She does have some diarrhea from the metformin. She is asking for ER dose.     She is also due for Hep A #2 today.        The following portions of the patient's history were reviewed and updated as appropriate: allergies, current medications, past family history, past medical history, past social history, past surgical history and problem list.    Review of Systems   Constitutional: Negative for fatigue.   Eyes: Negative for blurred vision.   Respiratory: Negative for shortness of breath.    Cardiovascular: Negative for chest pain and palpitations.   Endocrine: Negative for polydipsia and polyuria.   Neurological: Negative for dizziness and headache.       Objective   Physical Exam   Constitutional: She is oriented to person, place, and time. She appears well-developed and well-nourished.   Eyes: Pupils are equal, round, and reactive to light.   Cardiovascular: Normal rate and regular rhythm.   Pulmonary/Chest: Effort normal and breath sounds normal.   Neurological: She is alert and oriented to person, place, and time.   Psychiatric: She has a normal mood and affect. Her behavior is normal.         Diagnoses and all orders for this visit:    1. " Type 2 diabetes mellitus with hyperglycemia, without long-term current use of insulin (CMS/[facility]) (Primary)  Comments:  check labs today. Will switch metformin to ER.   Orders:  -     CBC & Differential; Future  -     Comprehensive Metabolic Panel; Future  -     Hemoglobin A1c; Future  -     metFORMIN ER (GLUCOPHAGE-XR) 750 MG 24 hr tablet; Take 1 tablet by mouth Daily With Breakfast.  Dispense: 90 tablet; Refill: 3  -     MicroAlbumin, Urine, Random - Urine, Clean Catch; Future    2. Other hyperlipidemia  -     Lipid Panel; Future    3. Other specified hypothyroidism  -     TSH; Future    4. Bipolar affective disorder in remission (CMS/[facility])  Comments:  sees Dr. Culver and jae.     5. Immunization due  -     Hepatitis A Vaccine Adult IM    The importance of monitoring blood sugar regularly was reviewed.  The importance of monitoring the HgBA1C level regularly was reviewed.  The importance of monitoring urine microalbumin regularly to check for kidney damage was reviewed.   The importance of annual eye exams to prevent blindness was reviewed.  The importance of proper foot care and regularly checking feet to prevent sores and possibly loss of limbs was reviewed.   During this office visit, we discussed the pertinent aspects of the visit and treatment recommendations. Pt verbalizes understanding. Follow up was discussed. Patient was given the opportunity to ask questions and discuss other concerns.       Return in about 3 months (around 8/12/2020) for Diabetes follow up.

## 2020-05-13 LAB — HBA1C MFR BLD: 5.4 % (ref 3.5–5.6)

## 2020-05-14 ENCOUNTER — TELEPHONE (OUTPATIENT)
Dept: FAMILY MEDICINE CLINIC | Facility: CLINIC | Age: 60
End: 2020-05-14

## 2020-05-14 NOTE — PROGRESS NOTES
Let pt know that labs looked Great!!! Her A1C has dropped to 5.4 from 9.2!! Cont to work on diet. Lets follow up in 6 months and repeat labs. If still doing this good, will see about getting you off medicine.

## 2020-05-14 NOTE — TELEPHONE ENCOUNTER
----- Message from JENNIFER Swift sent at 5/14/2020 12:56 PM EDT -----  Let pt know that labs looked Great!!! Her A1C has dropped to 5.4 from 9.2!! Cont to work on diet. Lets follow up in 6 months and repeat labs. If still doing this good, will see about getting you off medicine.

## 2020-06-17 ENCOUNTER — OFFICE VISIT (OUTPATIENT)
Dept: ENDOCRINOLOGY | Facility: CLINIC | Age: 60
End: 2020-06-17

## 2020-06-17 VITALS
SYSTOLIC BLOOD PRESSURE: 142 MMHG | HEIGHT: 60 IN | HEART RATE: 86 BPM | BODY MASS INDEX: 43.98 KG/M2 | TEMPERATURE: 97.3 F | DIASTOLIC BLOOD PRESSURE: 72 MMHG | WEIGHT: 224 LBS | OXYGEN SATURATION: 96 %

## 2020-06-17 DIAGNOSIS — E03.9 HYPOTHYROIDISM, UNSPECIFIED TYPE: ICD-10-CM

## 2020-06-17 DIAGNOSIS — E11.65 TYPE 2 DIABETES MELLITUS WITH HYPERGLYCEMIA, WITHOUT LONG-TERM CURRENT USE OF INSULIN (HCC): Primary | ICD-10-CM

## 2020-06-17 DIAGNOSIS — E78.2 MIXED HYPERLIPIDEMIA: ICD-10-CM

## 2020-06-17 LAB — GLUCOSE BLDC GLUCOMTR-MCNC: 117 MG/DL (ref 70–130)

## 2020-06-17 PROCEDURE — 82962 GLUCOSE BLOOD TEST: CPT | Performed by: INTERNAL MEDICINE

## 2020-06-17 PROCEDURE — 99204 OFFICE O/P NEW MOD 45 MIN: CPT | Performed by: INTERNAL MEDICINE

## 2020-06-17 RX ORDER — ASPIRIN 81 MG/1
81 TABLET ORAL DAILY
COMMUNITY
End: 2021-09-20

## 2020-06-17 RX ORDER — UREA 10 %
LOTION (ML) TOPICAL DAILY
COMMUNITY

## 2020-06-17 NOTE — PATIENT INSTRUCTIONS
Increase exercise as planned.  Reschedule diabetes education classes.  Check blood sugar before meals & bedtime 2 d / week. Record.  Call if blood sugars are running over 160.  F/u in 6 months, with fasting labs prior.

## 2020-06-28 DIAGNOSIS — E03.8 OTHER SPECIFIED HYPOTHYROIDISM: ICD-10-CM

## 2020-06-29 RX ORDER — LEVOTHYROXINE SODIUM 0.2 MG/1
TABLET ORAL
Qty: 90 TABLET | Refills: 1 | Status: SHIPPED | OUTPATIENT
Start: 2020-06-29 | End: 2021-01-04

## 2020-07-14 DIAGNOSIS — E11.65 TYPE 2 DIABETES MELLITUS WITH HYPERGLYCEMIA, WITHOUT LONG-TERM CURRENT USE OF INSULIN (HCC): ICD-10-CM

## 2020-07-23 ENCOUNTER — TELEPHONE (OUTPATIENT)
Dept: FAMILY MEDICINE CLINIC | Facility: CLINIC | Age: 60
End: 2020-07-23

## 2020-07-23 DIAGNOSIS — E11.65 TYPE 2 DIABETES MELLITUS WITH HYPERGLYCEMIA, WITHOUT LONG-TERM CURRENT USE OF INSULIN (HCC): ICD-10-CM

## 2020-07-23 NOTE — TELEPHONE ENCOUNTER
Patient called and received letter from [facility] that her metFORMIN ER (GLUCOPHAGE-XR) 750 MG 24 hr tablet has been recalled. Would like to know what to do now or what will be called in.    Best call back- [phone]      Verified pharmacy-[facility]/pharmacy #[id number] - [place], [place] - [address] - [phone]  - [phone] FX

## 2020-07-23 NOTE — TELEPHONE ENCOUNTER
She stated you will have to call them at [facility] and put her on the metformin regular instead of the metformin ER 750mg.

## 2020-07-27 ENCOUNTER — OFFICE VISIT (OUTPATIENT)
Dept: ENDOCRINOLOGY | Facility: CLINIC | Age: 60
End: 2020-07-27

## 2020-07-27 DIAGNOSIS — E11.65 TYPE 2 DIABETES MELLITUS WITH HYPERGLYCEMIA, WITHOUT LONG-TERM CURRENT USE OF INSULIN (HCC): ICD-10-CM

## 2020-07-27 PROCEDURE — G0108 DIAB MANAGE TRN  PER INDIV: HCPCS | Performed by: DIETITIAN, REGISTERED

## 2020-08-06 RX ORDER — BLOOD SUGAR DIAGNOSTIC
STRIP MISCELLANEOUS
Qty: 100 EACH | Refills: 5 | Status: SHIPPED | OUTPATIENT
Start: 2020-08-06 | End: 2021-09-20 | Stop reason: SDUPTHER

## 2020-08-17 ENCOUNTER — TELEPHONE (OUTPATIENT)
Dept: FAMILY MEDICINE CLINIC | Facility: CLINIC | Age: 60
End: 2020-08-17

## 2020-08-17 DIAGNOSIS — E11.65 TYPE 2 DIABETES MELLITUS WITH HYPERGLYCEMIA, WITHOUT LONG-TERM CURRENT USE OF INSULIN (HCC): ICD-10-CM

## 2020-08-17 RX ORDER — EXENATIDE 2 MG/.85ML
2 INJECTION, SUSPENSION, EXTENDED RELEASE SUBCUTANEOUS WEEKLY
Qty: 12 PEN | Refills: 3 | Status: SHIPPED | OUTPATIENT
Start: 2020-08-17 | End: 2020-09-08

## 2020-08-17 NOTE — TELEPHONE ENCOUNTER
Patient is calling to state that the medication shot, Bydureon is going to cost over $1,000.00 without the card that Hannah had given to her.  Pharmacy told the patient that there can be a prior authorization done.  Patient is also questioning if she could get another discount card like the one before.  Patient only has one shot left.  If unable to do the prior auth or get the discount card, will need a different medication.    Please advise.    Patient call back [phone]     [facility]/pharmacy #[id number] - [place], [place] - [address] - [phone]  - [phone] FX  [phone]

## 2020-08-19 NOTE — TELEPHONE ENCOUNTER
PATIENT WAS CALLING TO SEE IF THERE HAS BEEN PROGRESS ON THE PREVIOUS MESSAGES REGARDING HER BYDUREON INJECTIONS. PATIENT WOULD LIKE A CALL BACK [phone]     PATIENT CAN NOT AFFORD THE INJECTIONS WITHOUT EITHER A DISCOUNT CARD OR APPROVAL THROUGH THE INSURANCE COMPANY PLEASE ADVISE

## 2020-08-25 ENCOUNTER — TELEPHONE (OUTPATIENT)
Dept: FAMILY MEDICINE CLINIC | Facility: CLINIC | Age: 60
End: 2020-08-25

## 2020-08-25 NOTE — TELEPHONE ENCOUNTER
PT CANNOT AFFORD HER Bydureon RX AND THERES STILL BEEN NO PROGRESS ON THE PA SO PATIENT IS WONDERING IF SHE CAN JUST STOP TAKING IT AND IF [facility] WANTS TO INCREASE HER METFORMIN.

## 2020-09-08 ENCOUNTER — LAB (OUTPATIENT)
Dept: FAMILY MEDICINE CLINIC | Facility: CLINIC | Age: 60
End: 2020-09-08

## 2020-09-08 ENCOUNTER — OFFICE VISIT (OUTPATIENT)
Dept: FAMILY MEDICINE CLINIC | Facility: CLINIC | Age: 60
End: 2020-09-08

## 2020-09-08 VITALS
BODY MASS INDEX: 44.76 KG/M2 | HEART RATE: 90 BPM | TEMPERATURE: 96.8 F | SYSTOLIC BLOOD PRESSURE: 170 MMHG | WEIGHT: 228 LBS | HEIGHT: 60 IN | DIASTOLIC BLOOD PRESSURE: 90 MMHG | OXYGEN SATURATION: 98 %

## 2020-09-08 DIAGNOSIS — E11.65 TYPE 2 DIABETES MELLITUS WITH HYPERGLYCEMIA, WITHOUT LONG-TERM CURRENT USE OF INSULIN (HCC): ICD-10-CM

## 2020-09-08 DIAGNOSIS — R03.0 ELEVATED BLOOD-PRESSURE READING WITHOUT DIAGNOSIS OF HYPERTENSION: ICD-10-CM

## 2020-09-08 DIAGNOSIS — E11.65 TYPE 2 DIABETES MELLITUS WITH HYPERGLYCEMIA, WITHOUT LONG-TERM CURRENT USE OF INSULIN (HCC): Primary | ICD-10-CM

## 2020-09-08 LAB
ALBUMIN SERPL-MCNC: 4.7 G/DL (ref 3.5–5.2)
ALBUMIN/GLOB SERPL: 1.7 G/DL
ALP SERPL-CCNC: 86 U/L (ref 39–117)
ALT SERPL W P-5'-P-CCNC: 40 U/L (ref 1–33)
ANION GAP SERPL CALCULATED.3IONS-SCNC: 12.8 MMOL/L (ref 5–15)
AST SERPL-CCNC: 33 U/L (ref 1–32)
BILIRUB SERPL-MCNC: 0.3 MG/DL (ref 0–1.2)
BUN SERPL-MCNC: 11 MG/DL (ref 8–23)
BUN/CREAT SERPL: 14.7 (ref 7–25)
CALCIUM SPEC-SCNC: 10 MG/DL (ref 8.6–10.5)
CHLORIDE SERPL-SCNC: 103 MMOL/L (ref 98–107)
CO2 SERPL-SCNC: 25.2 MMOL/L (ref 22–29)
CREAT SERPL-MCNC: 0.75 MG/DL (ref 0.57–1)
GFR SERPL CREATININE-BSD FRML MDRD: 79 ML/MIN/1.73
GLOBULIN UR ELPH-MCNC: 2.8 GM/DL
GLUCOSE SERPL-MCNC: 113 MG/DL (ref 65–99)
HBA1C MFR BLD: 5.9 % (ref 3.5–5.6)
POTASSIUM SERPL-SCNC: 4.5 MMOL/L (ref 3.5–5.2)
PROT SERPL-MCNC: 7.5 G/DL (ref 6–8.5)
SODIUM SERPL-SCNC: 141 MMOL/L (ref 136–145)

## 2020-09-08 PROCEDURE — 90674 CCIIV4 VAC NO PRSV 0.5 ML IM: CPT | Performed by: NURSE PRACTITIONER

## 2020-09-08 PROCEDURE — 99214 OFFICE O/P EST MOD 30 MIN: CPT | Performed by: NURSE PRACTITIONER

## 2020-09-08 PROCEDURE — 83036 HEMOGLOBIN GLYCOSYLATED A1C: CPT | Performed by: NURSE PRACTITIONER

## 2020-09-08 PROCEDURE — 80053 COMPREHEN METABOLIC PANEL: CPT | Performed by: NURSE PRACTITIONER

## 2020-09-08 PROCEDURE — 90471 IMMUNIZATION ADMIN: CPT | Performed by: NURSE PRACTITIONER

## 2020-09-08 RX ORDER — METFORMIN HYDROCHLORIDE 750 MG/1
750 TABLET, EXTENDED RELEASE ORAL
Qty: 30 TABLET | Refills: 6 | Status: SHIPPED | OUTPATIENT
Start: 2020-09-08 | End: 2021-02-23

## 2020-09-08 NOTE — PROGRESS NOTES
"Subjective   Mary Grace Walsh is a 59 y.o. female.     Chief Complaint   Patient presents with   • Diabetes       /90 (BP Location: Right arm, Patient Position: Sitting, Cuff Size: Adult)   Pulse 90   Temp 96.8 °F (36 °C) (Temporal)   Ht 152.4 cm (60\")   Wt 103 kg (228 lb)   SpO2 98%   Breastfeeding No   BMI 44.53 kg/m²     BP Readings from Last 3 Encounters:   09/08/20 170/90   06/17/20 142/72   05/12/20 120/84       Wt Readings from Last 3 Encounters:   09/08/20 103 kg (228 lb)   06/17/20 102 kg (224 lb)   05/12/20 98.4 kg (217 lb)       Pt comes in today to discuss issues she is having with her diabetes medications.   She was on metformin and bydureon, however the bydureon was too expensive and couldn't get it. We had then increased her metformin to 1000mg bid to help compensate her being off the bydureon. She is with c/o increased diarrhea since increasing her metformin and would like an alternative.   Her most recent A1C was much improved though at 5.4 in May which was down from 9.2!   We will repeat labs today, and may be able to cont metformin alone w/o 2nd agent. In the meantime we will change her metformin to ER and see if that helps her diarrhea.        The following portions of the patient's history were reviewed and updated as appropriate: allergies, current medications, past family history, past medical history, past social history, past surgical history and problem list.    Review of Systems   Constitutional: Negative for fatigue.   Eyes: Negative for blurred vision.   Respiratory: Negative for shortness of breath.    Cardiovascular: Negative for chest pain and palpitations.   Gastrointestinal: Positive for diarrhea.   Endocrine: Negative for polydipsia and polyuria.   Neurological: Negative for dizziness.       Objective   Physical Exam   Constitutional: She is oriented to person, place, and time. She appears well-developed and well-nourished.   Eyes: Pupils are equal, round, and reactive to " light.   Cardiovascular: Normal rate and regular rhythm.   Pulmonary/Chest: Effort normal and breath sounds normal.   Neurological: She is alert and oriented to person, place, and time.       Diagnoses and all orders for this visit:    1. Type 2 diabetes mellitus with hyperglycemia, without long-term current use of insulin (CMS/[facility]) (Primary)  -     metFORMIN ER (GLUCOPHAGE-XR) 750 MG 24 hr tablet; Take 1 tablet by mouth Daily With Breakfast.  Dispense: 30 tablet; Refill: 6  -     Comprehensive Metabolic Panel; Future  -     Hemoglobin A1c; Future    2. Elevated blood-pressure reading without diagnosis of hypertension  Comments:  Repeat 142/74. Will cont to monitor     Other orders  -     Flucelvax Quad=>4Years (PFS)    change metformin to ER once daily  Repeat labs  Discussed importance of regular exercise and recommended starting or continuing a regular exercise program for good health. The patient was also encouraged to lose weight for better health.   The importance of monitoring blood sugar regularly was reviewed.  The importance of monitoring the HgBA1C level regularly was reviewed.  The importance of monitoring urine microalbumin regularly to check for kidney damage was reviewed.   The importance of annual eye exams to prevent blindness was reviewed.  The importance of proper foot care and regularly checking feet to prevent sores and possibly loss of limbs was reviewed.   During this office visit, we discussed the pertinent aspects of the visit and treatment recommendations. Pt verbalizes understanding. Follow up was discussed. Patient was given the opportunity to ask questions and discuss other concerns.       Return in about 3 months (around 12/8/2020).

## 2020-09-09 NOTE — PROGRESS NOTES
Let pt know that her A1C is 5.9. I'm ok with her just taking the metformin for now. We will repeat numbers in 6 months. Cont to work on diet changes. Let me know how the new metformin dose does as far as diarrhea goes

## 2020-09-14 ENCOUNTER — OFFICE VISIT (OUTPATIENT)
Dept: ENDOCRINOLOGY | Facility: CLINIC | Age: 60
End: 2020-09-14

## 2020-09-14 DIAGNOSIS — E11.65 TYPE 2 DIABETES MELLITUS WITH HYPERGLYCEMIA, WITHOUT LONG-TERM CURRENT USE OF INSULIN (HCC): ICD-10-CM

## 2020-09-14 PROCEDURE — G0108 DIAB MANAGE TRN  PER INDIV: HCPCS | Performed by: DIETITIAN, REGISTERED

## 2020-11-02 ENCOUNTER — OFFICE VISIT (OUTPATIENT)
Dept: FAMILY MEDICINE CLINIC | Facility: CLINIC | Age: 60
End: 2020-11-02

## 2020-11-02 VITALS
DIASTOLIC BLOOD PRESSURE: 80 MMHG | HEIGHT: 60 IN | SYSTOLIC BLOOD PRESSURE: 130 MMHG | TEMPERATURE: 96.6 F | BODY MASS INDEX: 45.16 KG/M2 | HEART RATE: 87 BPM | WEIGHT: 230 LBS | OXYGEN SATURATION: 98 %

## 2020-11-02 DIAGNOSIS — E11.65 TYPE 2 DIABETES MELLITUS WITH HYPERGLYCEMIA, WITHOUT LONG-TERM CURRENT USE OF INSULIN (HCC): Primary | ICD-10-CM

## 2020-11-02 PROCEDURE — 99214 OFFICE O/P EST MOD 30 MIN: CPT | Performed by: NURSE PRACTITIONER

## 2020-11-02 NOTE — PROGRESS NOTES
"Subjective   Mary Grace Walsh is a 60 y.o. female.     Chief Complaint   Patient presents with   • Diabetes       /80 (BP Location: Left arm, Patient Position: Sitting, Cuff Size: Adult)   Pulse 87   Temp 96.6 °F (35.9 °C) (Temporal)   Ht 152.4 cm (60\")   Wt 104 kg (230 lb)   SpO2 98%   BMI 44.92 kg/m²     BP Readings from Last 3 Encounters:   11/02/20 130/80   09/08/20 170/90   06/17/20 142/72       Wt Readings from Last 3 Encounters:   11/02/20 104 kg (230 lb)   09/08/20 103 kg (228 lb)   06/17/20 102 kg (224 lb)       Pt comes in today for follow up on DM. Was here about 2 months ago and we had switched her metformin to 750mg ER daily. A1C at that time was 5.9.  Had been on or we tried bydrueon at one time, but it was too expensive.   She is here today with concerns of BS going up. In the past week she has been getting readings of 200s-300s.   Glucometer states:  30 day average: 238  14 day average: 272  7 day average: 271    She knows she hasn't been eating good lately. This morning BS was over 350. Ate cake last night.   She would like to see about adding something to metformin. She wasn't able to tolerate higher doses of metformin 2/2 diarrhea.   She checked with insurance and got prices on jardiance, trulicity, and ozempic, and they are covered.     Diabetes  Pertinent negatives for hypoglycemia include no dizziness. Associated symptoms include polydipsia and polyuria. Pertinent negatives for diabetes include no blurred vision, no chest pain and no fatigue.        The following portions of the patient's history were reviewed and updated as appropriate: allergies, current medications, past family history, past medical history, past social history, past surgical history and problem list.    Review of Systems   Constitutional: Negative for fatigue.   Eyes: Negative for blurred vision.   Respiratory: Negative for shortness of breath.    Cardiovascular: Negative for chest pain and palpitations. "   Endocrine: Positive for polydipsia and polyuria.   Neurological: Negative for dizziness and headache.       Objective   Physical Exam  Constitutional:       Appearance: She is well-developed.   Eyes:      Pupils: Pupils are equal, round, and reactive to light.   Cardiovascular:      Rate and Rhythm: Normal rate and regular rhythm.   Pulmonary:      Effort: Pulmonary effort is normal.      Breath sounds: Normal breath sounds.   Neurological:      Mental Status: She is alert and oriented to person, place, and time.       Diagnoses and all orders for this visit:    1. Type 2 diabetes mellitus with hyperglycemia, without long-term current use of insulin (CMS/[facility]) (Primary)  -     Empagliflozin 10 MG tablet; Take 10 mg by mouth Every Morning Before Breakfast for 180 days.  Dispense: 90 tablet; Refill: 1    will add jardiance to metformin  Cont to monitor BS and keep diary  Work on DM diet  Discussed importance of regular exercise and recommended starting or continuing a regular exercise program for good health. The patient was also encouraged to lose weight for better health.   The importance of monitoring blood sugar regularly was reviewed.  The importance of monitoring the HgBA1C level regularly was reviewed.  The importance of monitoring urine microalbumin regularly to check for kidney damage was reviewed.   The importance of annual eye exams to prevent blindness was reviewed.  The importance of proper foot care and regularly checking feet to prevent sores and possibly loss of limbs was reviewed.   During this office visit, we discussed the pertinent aspects of the visit and treatment recommendations. Pt verbalizes understanding. Follow up was discussed. Patient was given the opportunity to ask questions and discuss other concerns.       Return in about 3 months (around 2/2/2021).

## 2020-12-17 RX ORDER — FENOFIBRATE 160 MG/1
TABLET ORAL
Qty: 90 TABLET | Refills: 2 | Status: SHIPPED | OUTPATIENT
Start: 2020-12-17 | End: 2021-08-03

## 2021-01-03 DIAGNOSIS — E03.8 OTHER SPECIFIED HYPOTHYROIDISM: ICD-10-CM

## 2021-01-04 RX ORDER — LEVOTHYROXINE SODIUM 0.2 MG/1
TABLET ORAL
Qty: 90 TABLET | Refills: 1 | Status: SHIPPED | OUTPATIENT
Start: 2021-01-04 | End: 2021-07-12

## 2021-01-29 DIAGNOSIS — E03.9 HYPOTHYROIDISM, UNSPECIFIED TYPE: ICD-10-CM

## 2021-01-29 DIAGNOSIS — E78.5 HYPERLIPIDEMIA, UNSPECIFIED HYPERLIPIDEMIA TYPE: ICD-10-CM

## 2021-01-29 DIAGNOSIS — R03.0 ELEVATED BLOOD PRESSURE READING WITHOUT DIAGNOSIS OF HYPERTENSION: ICD-10-CM

## 2021-01-29 DIAGNOSIS — E11.65 TYPE 2 DIABETES MELLITUS WITH HYPERGLYCEMIA, WITHOUT LONG-TERM CURRENT USE OF INSULIN (HCC): Primary | ICD-10-CM

## 2021-02-01 ENCOUNTER — LAB (OUTPATIENT)
Dept: FAMILY MEDICINE CLINIC | Facility: CLINIC | Age: 61
End: 2021-02-01

## 2021-02-01 DIAGNOSIS — E78.5 HYPERLIPIDEMIA, UNSPECIFIED HYPERLIPIDEMIA TYPE: ICD-10-CM

## 2021-02-01 DIAGNOSIS — R03.0 ELEVATED BLOOD PRESSURE READING WITHOUT DIAGNOSIS OF HYPERTENSION: ICD-10-CM

## 2021-02-01 DIAGNOSIS — E11.65 TYPE 2 DIABETES MELLITUS WITH HYPERGLYCEMIA, WITHOUT LONG-TERM CURRENT USE OF INSULIN (HCC): ICD-10-CM

## 2021-02-01 DIAGNOSIS — E03.9 HYPOTHYROIDISM, UNSPECIFIED TYPE: ICD-10-CM

## 2021-02-01 LAB
ALBUMIN SERPL-MCNC: 4.7 G/DL (ref 3.5–5.2)
ALBUMIN UR-MCNC: 1.2 MG/DL
ALBUMIN/GLOB SERPL: 1.2 G/DL
ALP SERPL-CCNC: 86 U/L (ref 39–117)
ALT SERPL W P-5'-P-CCNC: 29 U/L (ref 1–33)
ANION GAP SERPL CALCULATED.3IONS-SCNC: 12 MMOL/L (ref 5–15)
AST SERPL-CCNC: 29 U/L (ref 1–32)
BASOPHILS # BLD AUTO: 0.03 10*3/MM3 (ref 0–0.2)
BASOPHILS NFR BLD AUTO: 0.6 % (ref 0–1.5)
BILIRUB SERPL-MCNC: 0.3 MG/DL (ref 0–1.2)
BUN SERPL-MCNC: 11 MG/DL (ref 8–23)
BUN/CREAT SERPL: 12.4 (ref 7–25)
CALCIUM SPEC-SCNC: 10.2 MG/DL (ref 8.6–10.5)
CHLORIDE SERPL-SCNC: 101 MMOL/L (ref 98–107)
CHOLEST SERPL-MCNC: 258 MG/DL (ref 0–200)
CO2 SERPL-SCNC: 26 MMOL/L (ref 22–29)
CREAT SERPL-MCNC: 0.89 MG/DL (ref 0.57–1)
CREAT UR-MCNC: 62.3 MG/DL
DEPRECATED RDW RBC AUTO: 38.5 FL (ref 37–54)
EOSINOPHIL # BLD AUTO: 0.17 10*3/MM3 (ref 0–0.4)
EOSINOPHIL NFR BLD AUTO: 3.5 % (ref 0.3–6.2)
ERYTHROCYTE [DISTWIDTH] IN BLOOD BY AUTOMATED COUNT: 12.6 % (ref 12.3–15.4)
GFR SERPL CREATININE-BSD FRML MDRD: 64 ML/MIN/1.73
GLOBULIN UR ELPH-MCNC: 3.8 GM/DL
GLUCOSE SERPL-MCNC: 184 MG/DL (ref 65–99)
HBA1C MFR BLD: 8.5 % (ref 3.5–5.6)
HCT VFR BLD AUTO: 43.9 % (ref 34–46.6)
HDLC SERPL-MCNC: 30 MG/DL (ref 40–60)
HGB BLD-MCNC: 14.7 G/DL (ref 12–15.9)
IMM GRANULOCYTES # BLD AUTO: 0.04 10*3/MM3 (ref 0–0.05)
IMM GRANULOCYTES NFR BLD AUTO: 0.8 % (ref 0–0.5)
LDLC SERPL CALC-MCNC: 139 MG/DL (ref 0–100)
LDLC/HDLC SERPL: 4.42 {RATIO}
LYMPHOCYTES # BLD AUTO: 1.84 10*3/MM3 (ref 0.7–3.1)
LYMPHOCYTES NFR BLD AUTO: 37.6 % (ref 19.6–45.3)
MCH RBC QN AUTO: 28.7 PG (ref 26.6–33)
MCHC RBC AUTO-ENTMCNC: 33.5 G/DL (ref 31.5–35.7)
MCV RBC AUTO: 85.7 FL (ref 79–97)
MICROALBUMIN/CREAT UR: 19.3 MG/G
MONOCYTES # BLD AUTO: 0.38 10*3/MM3 (ref 0.1–0.9)
MONOCYTES NFR BLD AUTO: 7.8 % (ref 5–12)
NEUTROPHILS NFR BLD AUTO: 2.43 10*3/MM3 (ref 1.7–7)
NEUTROPHILS NFR BLD AUTO: 49.7 % (ref 42.7–76)
NRBC BLD AUTO-RTO: 0 /100 WBC (ref 0–0.2)
PLATELET # BLD AUTO: 257 10*3/MM3 (ref 140–450)
PMV BLD AUTO: 11.4 FL (ref 6–12)
POTASSIUM SERPL-SCNC: 4.6 MMOL/L (ref 3.5–5.2)
PROT SERPL-MCNC: 8.5 G/DL (ref 6–8.5)
RBC # BLD AUTO: 5.12 10*6/MM3 (ref 3.77–5.28)
SODIUM SERPL-SCNC: 139 MMOL/L (ref 136–145)
TRIGL SERPL-MCNC: 477 MG/DL (ref 0–150)
TSH SERPL DL<=0.05 MIU/L-ACNC: 0.57 UIU/ML (ref 0.27–4.2)
VLDLC SERPL-MCNC: 89 MG/DL (ref 5–40)
WBC # BLD AUTO: 4.89 10*3/MM3 (ref 3.4–10.8)

## 2021-02-01 PROCEDURE — 82043 UR ALBUMIN QUANTITATIVE: CPT | Performed by: NURSE PRACTITIONER

## 2021-02-01 PROCEDURE — 36415 COLL VENOUS BLD VENIPUNCTURE: CPT

## 2021-02-01 PROCEDURE — 80061 LIPID PANEL: CPT | Performed by: NURSE PRACTITIONER

## 2021-02-01 PROCEDURE — 85025 COMPLETE CBC W/AUTO DIFF WBC: CPT | Performed by: NURSE PRACTITIONER

## 2021-02-01 PROCEDURE — 83036 HEMOGLOBIN GLYCOSYLATED A1C: CPT | Performed by: NURSE PRACTITIONER

## 2021-02-01 PROCEDURE — 80053 COMPREHEN METABOLIC PANEL: CPT | Performed by: NURSE PRACTITIONER

## 2021-02-01 PROCEDURE — 82570 ASSAY OF URINE CREATININE: CPT | Performed by: NURSE PRACTITIONER

## 2021-02-01 PROCEDURE — 84443 ASSAY THYROID STIM HORMONE: CPT | Performed by: NURSE PRACTITIONER

## 2021-02-04 ENCOUNTER — OFFICE VISIT (OUTPATIENT)
Dept: FAMILY MEDICINE CLINIC | Facility: CLINIC | Age: 61
End: 2021-02-04

## 2021-02-04 VITALS
BODY MASS INDEX: 43.62 KG/M2 | HEIGHT: 60 IN | OXYGEN SATURATION: 97 % | WEIGHT: 222.2 LBS | SYSTOLIC BLOOD PRESSURE: 150 MMHG | DIASTOLIC BLOOD PRESSURE: 80 MMHG | HEART RATE: 83 BPM | TEMPERATURE: 96.2 F

## 2021-02-04 DIAGNOSIS — E11.65 TYPE 2 DIABETES MELLITUS WITH HYPERGLYCEMIA, WITHOUT LONG-TERM CURRENT USE OF INSULIN (HCC): Primary | ICD-10-CM

## 2021-02-04 DIAGNOSIS — E66.01 MORBIDLY OBESE (HCC): ICD-10-CM

## 2021-02-04 DIAGNOSIS — Z12.11 ENCOUNTER FOR SCREENING COLONOSCOPY: ICD-10-CM

## 2021-02-04 PROCEDURE — 99214 OFFICE O/P EST MOD 30 MIN: CPT | Performed by: NURSE PRACTITIONER

## 2021-02-04 NOTE — PROGRESS NOTES
"Subjective   Mary Grace Walsh is a 60 y.o. female.     Chief Complaint   Patient presents with   • Diabetes   • Hypertension   • Hyperlipidemia       /80 (BP Location: Right arm, Patient Position: Sitting, Cuff Size: Large Adult)   Pulse 83   Temp 96.2 °F (35.7 °C) (Skin)   Ht 152.4 cm (60\")   Wt 101 kg (222 lb 3.2 oz)   SpO2 97%   BMI 43.40 kg/m²     BP Readings from Last 3 Encounters:   02/04/21 150/80   11/02/20 130/80   09/08/20 170/90       Wt Readings from Last 3 Encounters:   02/04/21 101 kg (222 lb 3.2 oz)   11/02/20 104 kg (230 lb)   09/08/20 103 kg (228 lb)       Pt comes in today for follow up on DM. At last appt we had added jardiance 10mg daily. Tolerating it well.   A1C went from 5.9 (in September) to 8.5 now.  BS running around 165-200's different times of day.   Admits to eating poorly and not doing well managing DM.     Diabetes  Pertinent negatives for hypoglycemia include no dizziness. Associated symptoms include polydipsia. Pertinent negatives for diabetes include no blurred vision, no chest pain, no fatigue and no polyuria.   Hypertension  Pertinent negatives include no blurred vision, chest pain, palpitations or shortness of breath.   Hyperlipidemia  Pertinent negatives include no chest pain or shortness of breath.        The following portions of the patient's history were reviewed and updated as appropriate: allergies, current medications, past family history, past medical history, past social history, past surgical history and problem list.    Review of Systems   Constitutional: Negative for fatigue.   Eyes: Negative for blurred vision.   Respiratory: Negative for shortness of breath.    Cardiovascular: Negative for chest pain and palpitations.   Endocrine: Positive for polydipsia. Negative for polyuria.   Neurological: Negative for dizziness and headache.       Objective   Physical Exam  Constitutional:       Appearance: She is well-developed.   Eyes:      Pupils: Pupils are equal, " round, and reactive to light.   Cardiovascular:      Rate and Rhythm: Normal rate and regular rhythm.   Pulmonary:      Effort: Pulmonary effort is normal.      Breath sounds: Normal breath sounds.   Neurological:      Mental Status: She is alert and oriented to person, place, and time.           Diagnoses and all orders for this visit:    1. Type 2 diabetes mellitus with hyperglycemia, without long-term current use of insulin (CMS/[facility]) (Primary)  -     Empagliflozin 25 MG tablet; Take 25 mg by mouth Every Morning Before Breakfast for 180 days.  Dispense: 90 tablet; Refill: 1    2. Encounter for screening colonoscopy  -     Mammo Screening Digital Tomosynthesis Bilateral With CAD; Future    3. Morbidly obese (CMS/[facility])  Assessment & Plan:  Obesity is unchanged.  Discussed the patient's BMI.  The BMI is above average; BMI management plan is completed.  General weight loss/lifestyle modification strategies discussed (elicit support from others; identify saboteurs; non-food rewards, etc).    will increase jardiance to 25mg daily  Will repeat labs in 3 months  Mammogram  The importance of monitoring blood sugar regularly was reviewed.  The importance of monitoring the HgBA1C level regularly was reviewed.  The importance of monitoring urine microalbumin regularly to check for kidney damage was reviewed.   The importance of annual eye exams to prevent blindness was reviewed.  The importance of proper foot care and regularly checking feet to prevent sores and possibly loss of limbs was reviewed.   Discussed importance of regular exercise and recommended starting or continuing a regular exercise program for good health. The patient was also encouraged to lose weight for better health.   During this office visit, we discussed the pertinent aspects of the visit and treatment recommendations. Pt verbalizes understanding. Follow up was discussed. Patient was given the opportunity to ask questions and discuss other concerns.      Return in about 3 months (around 5/4/2021) for Diabetes follow up.

## 2021-02-23 DIAGNOSIS — E11.65 TYPE 2 DIABETES MELLITUS WITH HYPERGLYCEMIA, WITHOUT LONG-TERM CURRENT USE OF INSULIN (HCC): ICD-10-CM

## 2021-02-23 RX ORDER — METFORMIN HYDROCHLORIDE 750 MG/1
TABLET, EXTENDED RELEASE ORAL
Qty: 90 TABLET | Refills: 2 | Status: SHIPPED | OUTPATIENT
Start: 2021-02-23 | End: 2021-12-14

## 2021-05-03 ENCOUNTER — LAB (OUTPATIENT)
Dept: FAMILY MEDICINE CLINIC | Facility: CLINIC | Age: 61
End: 2021-05-03

## 2021-05-03 DIAGNOSIS — E11.65 TYPE 2 DIABETES MELLITUS WITH HYPERGLYCEMIA, WITHOUT LONG-TERM CURRENT USE OF INSULIN (HCC): Primary | ICD-10-CM

## 2021-05-03 DIAGNOSIS — E78.5 HYPERLIPIDEMIA, UNSPECIFIED HYPERLIPIDEMIA TYPE: ICD-10-CM

## 2021-05-03 LAB
ALBUMIN SERPL-MCNC: 4.6 G/DL (ref 3.5–5.2)
ALBUMIN/GLOB SERPL: 1.5 G/DL
ALP SERPL-CCNC: 91 U/L (ref 39–117)
ALT SERPL W P-5'-P-CCNC: 27 U/L (ref 1–33)
ANION GAP SERPL CALCULATED.3IONS-SCNC: 14.3 MMOL/L (ref 5–15)
AST SERPL-CCNC: 24 U/L (ref 1–32)
BILIRUB SERPL-MCNC: 0.4 MG/DL (ref 0–1.2)
BUN SERPL-MCNC: 13 MG/DL (ref 8–23)
BUN/CREAT SERPL: 17.8 (ref 7–25)
CALCIUM SPEC-SCNC: 10.1 MG/DL (ref 8.6–10.5)
CHLORIDE SERPL-SCNC: 102 MMOL/L (ref 98–107)
CHOLEST SERPL-MCNC: 241 MG/DL (ref 0–200)
CO2 SERPL-SCNC: 24.7 MMOL/L (ref 22–29)
CREAT SERPL-MCNC: 0.73 MG/DL (ref 0.57–1)
GFR SERPL CREATININE-BSD FRML MDRD: 81 ML/MIN/1.73
GLOBULIN UR ELPH-MCNC: 3 GM/DL
GLUCOSE SERPL-MCNC: 166 MG/DL (ref 65–99)
HBA1C MFR BLD: 7.6 % (ref 3.5–5.6)
HDLC SERPL-MCNC: 41 MG/DL (ref 40–60)
LDLC SERPL CALC-MCNC: 147 MG/DL (ref 0–100)
LDLC/HDLC SERPL: 3.47 {RATIO}
POTASSIUM SERPL-SCNC: 4.3 MMOL/L (ref 3.5–5.2)
PROT SERPL-MCNC: 7.6 G/DL (ref 6–8.5)
SODIUM SERPL-SCNC: 141 MMOL/L (ref 136–145)
TRIGL SERPL-MCNC: 288 MG/DL (ref 0–150)
VLDLC SERPL-MCNC: 53 MG/DL (ref 5–40)

## 2021-05-03 PROCEDURE — 83036 HEMOGLOBIN GLYCOSYLATED A1C: CPT | Performed by: NURSE PRACTITIONER

## 2021-05-03 PROCEDURE — 80053 COMPREHEN METABOLIC PANEL: CPT | Performed by: NURSE PRACTITIONER

## 2021-05-03 PROCEDURE — 36415 COLL VENOUS BLD VENIPUNCTURE: CPT

## 2021-05-03 PROCEDURE — 80061 LIPID PANEL: CPT | Performed by: NURSE PRACTITIONER

## 2021-05-04 DIAGNOSIS — E11.65 TYPE 2 DIABETES MELLITUS WITH HYPERGLYCEMIA, WITHOUT LONG-TERM CURRENT USE OF INSULIN (HCC): Primary | ICD-10-CM

## 2021-05-04 DIAGNOSIS — E78.5 HYPERLIPIDEMIA, UNSPECIFIED HYPERLIPIDEMIA TYPE: ICD-10-CM

## 2021-05-04 NOTE — PROGRESS NOTES
A1C did improve some and went down to 7.6. cont same regimen and lets repeat labs in 6 months. Cont to work on diet and lifestyle changes as well.     Trigs are still elevated at 288. Cont to work on diet for this as well. Avoiding fried/fatty foods.

## 2021-07-08 DIAGNOSIS — E03.8 OTHER SPECIFIED HYPOTHYROIDISM: ICD-10-CM

## 2021-07-08 DIAGNOSIS — E11.65 TYPE 2 DIABETES MELLITUS WITH HYPERGLYCEMIA, WITHOUT LONG-TERM CURRENT USE OF INSULIN (HCC): ICD-10-CM

## 2021-07-12 RX ORDER — LEVOTHYROXINE SODIUM 0.2 MG/1
TABLET ORAL
Qty: 90 TABLET | Refills: 1 | Status: SHIPPED | OUTPATIENT
Start: 2021-07-12 | End: 2022-01-04

## 2021-07-12 RX ORDER — EMPAGLIFLOZIN 25 MG/1
TABLET, FILM COATED ORAL
Qty: 90 TABLET | Refills: 1 | Status: SHIPPED | OUTPATIENT
Start: 2021-07-12 | End: 2022-01-04

## 2021-08-03 RX ORDER — FENOFIBRATE 160 MG/1
TABLET ORAL
Qty: 90 TABLET | Refills: 2 | Status: SHIPPED | OUTPATIENT
Start: 2021-08-03 | End: 2022-03-21 | Stop reason: SDUPTHER

## 2021-08-27 ENCOUNTER — TELEPHONE (OUTPATIENT)
Dept: FAMILY MEDICINE CLINIC | Facility: CLINIC | Age: 61
End: 2021-08-27

## 2021-08-27 NOTE — TELEPHONE ENCOUNTER
Patient wants a referral to [facility] for her diabetes.  She feels she needs to see them.

## 2021-08-30 DIAGNOSIS — E11.65 TYPE 2 DIABETES MELLITUS WITH HYPERGLYCEMIA, WITHOUT LONG-TERM CURRENT USE OF INSULIN (HCC): Primary | ICD-10-CM

## 2021-09-20 ENCOUNTER — LAB (OUTPATIENT)
Dept: LAB | Facility: HOSPITAL | Age: 61
End: 2021-09-20

## 2021-09-20 ENCOUNTER — OFFICE VISIT (OUTPATIENT)
Dept: ENDOCRINOLOGY | Facility: CLINIC | Age: 61
End: 2021-09-20

## 2021-09-20 VITALS
HEIGHT: 60 IN | SYSTOLIC BLOOD PRESSURE: 120 MMHG | BODY MASS INDEX: 42.8 KG/M2 | DIASTOLIC BLOOD PRESSURE: 76 MMHG | TEMPERATURE: 97.1 F | HEART RATE: 85 BPM | WEIGHT: 218 LBS

## 2021-09-20 DIAGNOSIS — E78.5 HYPERLIPIDEMIA, UNSPECIFIED HYPERLIPIDEMIA TYPE: ICD-10-CM

## 2021-09-20 DIAGNOSIS — E11.65 TYPE 2 DIABETES MELLITUS WITH HYPERGLYCEMIA, WITHOUT LONG-TERM CURRENT USE OF INSULIN (HCC): ICD-10-CM

## 2021-09-20 DIAGNOSIS — E11.65 TYPE 2 DIABETES MELLITUS WITH HYPERGLYCEMIA, WITHOUT LONG-TERM CURRENT USE OF INSULIN (HCC): Primary | ICD-10-CM

## 2021-09-20 DIAGNOSIS — E55.9 VITAMIN D DEFICIENCY: ICD-10-CM

## 2021-09-20 DIAGNOSIS — E03.9 HYPOTHYROIDISM, UNSPECIFIED TYPE: ICD-10-CM

## 2021-09-20 LAB
ALBUMIN SERPL-MCNC: 5 G/DL (ref 3.5–5.2)
ALBUMIN/GLOB SERPL: 1.7 G/DL
ALP SERPL-CCNC: 98 U/L (ref 39–117)
ALT SERPL W P-5'-P-CCNC: 27 U/L (ref 1–33)
ANION GAP SERPL CALCULATED.3IONS-SCNC: 11.8 MMOL/L (ref 5–15)
AST SERPL-CCNC: 25 U/L (ref 1–32)
BILIRUB SERPL-MCNC: 0.3 MG/DL (ref 0–1.2)
BUN SERPL-MCNC: 12 MG/DL (ref 8–23)
BUN/CREAT SERPL: 13.6 (ref 7–25)
CALCIUM SPEC-SCNC: 10 MG/DL (ref 8.6–10.5)
CHLORIDE SERPL-SCNC: 102 MMOL/L (ref 98–107)
CHOLEST SERPL-MCNC: 278 MG/DL (ref 0–200)
CO2 SERPL-SCNC: 26.2 MMOL/L (ref 22–29)
CREAT SERPL-MCNC: 0.88 MG/DL (ref 0.57–1)
GFR SERPL CREATININE-BSD FRML MDRD: 65 ML/MIN/1.73
GLOBULIN UR ELPH-MCNC: 2.9 GM/DL
GLUCOSE BLDC GLUCOMTR-MCNC: 167 MG/DL (ref 70–105)
GLUCOSE SERPL-MCNC: 196 MG/DL (ref 65–99)
HBA1C MFR BLD: 8.2 % (ref 3.5–5.6)
HDLC SERPL-MCNC: 29 MG/DL (ref 40–60)
LDLC SERPL CALC-MCNC: 141 MG/DL (ref 0–100)
LDLC/HDLC SERPL: 4.64 {RATIO}
POTASSIUM SERPL-SCNC: 4.5 MMOL/L (ref 3.5–5.2)
PROT SERPL-MCNC: 7.9 G/DL (ref 6–8.5)
SODIUM SERPL-SCNC: 140 MMOL/L (ref 136–145)
TRIGL SERPL-MCNC: 572 MG/DL (ref 0–150)
VLDLC SERPL-MCNC: 108 MG/DL (ref 5–40)

## 2021-09-20 PROCEDURE — 80053 COMPREHEN METABOLIC PANEL: CPT

## 2021-09-20 PROCEDURE — 82962 GLUCOSE BLOOD TEST: CPT | Performed by: INTERNAL MEDICINE

## 2021-09-20 PROCEDURE — 36415 COLL VENOUS BLD VENIPUNCTURE: CPT

## 2021-09-20 PROCEDURE — 80061 LIPID PANEL: CPT

## 2021-09-20 PROCEDURE — 83036 HEMOGLOBIN GLYCOSYLATED A1C: CPT

## 2021-09-20 PROCEDURE — 99214 OFFICE O/P EST MOD 30 MIN: CPT | Performed by: INTERNAL MEDICINE

## 2021-09-20 NOTE — PATIENT INSTRUCTIONS
Increase exercise as able.  Labs drawn today.  Will call you with the lab results.  Continue diabetes, thyroid & vit D supplements.  Restart Bydureon weekly.  Call if blood sugars are running under 100 or over 200.  F/u in 6 months, with labs prior.

## 2021-09-26 NOTE — PROGRESS NOTES
[facility]        Patient Care Team:  Hannah Oconnor APRN as PCP - General (Nurse Practitioner)  Felisha Culver MD (Psychiatry)  Addy Vu MD as Consulting Physician (Pulmonary Disease)    Chief Complaint:    Chief Complaint   Patient presents with   • Diabetes     Follow up, Type 2,  fasting         Subjective   Here for diabetes f/u  Blood sugars: in the 200's in am  Exercise program: walking  Taking vit D    Interval History:     Patient Complaints: off Bydureon x several months due to no coverage, but did not call us  Patient Denies:  hypoglycemia  History taken from: patient    Review of Systems:   Review of Systems   Eyes: Negative for blurred vision.   Gastrointestinal: Negative for nausea.   Endocrine: Negative for polyuria.   Neurological: Negative for headache.     Lost  6 lb since last visit    Objective     Vital Signs      Vitals:    09/20/21 1253   BP: 120/76   Pulse: 85   Temp: 97.1 °F (36.2 °C)       Physical Exam:     General Appearance:    Alert, cooperative, in no acute distress. Obese   Head:    Normocephalic, without obvious abnormality, atraumatic   Eyes:            Lids and lashes normal, conjunctivae and sclerae normal, no   icterus, no pallor, corneas clear, PERRLA   Throat:   No oral lesions,  oral mucosa moist   Neck:   No adenopathy, supple,  no thyromegaly, no   carotid bruit   Lungs:     Decreased breath sounds    Heart:    Regular rhythm and normal rate   Chest Wall:    No abnormalities observed   Abdomen:     Normal bowel sounds, soft                 Extremities:   Scars in both knees from previous surgery, no edema               Pulses:   Pulses palpable and equal bilaterally   Skin:   Dry   Neurologic:  DTR absent in ankles, vibratory sense 25% decreased in toes, able to feel the 10g monofilament ( same as 1y ago )            Results Review:    I have reviewed the patient's new clinical results, labs & imaging.    Medication Review:    Prior to Admission medications    Medication Sig Start Date End Date Taking? Authorizing Provider   ACCU-CHEK FASTCLIX LANCETS [facility] Pt to use to check her blood sugars 3/16/20  Yes Jovita Conde MD   ACCU-CHEK GUIDE test strip Pt to check her Blood sugars once a day 3/16/20  Yes Jovita Conde MD   ARIPiprazole (ABILIFY) 5 MG tablet  9/26/19  Yes Ivana Marrero MD   Biotin 58809 MCG tablet dispersible Place  on the tongue.   Yes Ivana Marrero MD   Blood Glucose Monitoring Suppl (ACCU-CHEK GUIDE ME) w/Device kit 1 each by Other route Take As Directed.   2/5/20  Yes Ivana Marrero MD   busPIRone (BUSPAR) 15 MG tablet Take 15 mg by mouth 4 (Four) Times a Day. prn 7/30/19  Yes Ivana Marrero MD   carBAMazepine (TEGretol) 200 MG tablet 200 mg 4 (Four) Times a Day. 7/30/19  Yes Ivana Marrero MD   Cholecalciferol (VITAMIN D3) 5000 units capsule capsule Take 5,000 Units by mouth Daily.   Yes Ivana Marrero MD   fenofibrate 160 MG tablet TAKE 1 TABLET BY MOUTH AT BEDTIME 8/3/21  Yes Hannah Oconnor APRN   gabapentin (NEURONTIN) 600 MG tablet Take 600 mg by mouth 2 (Two) Times a Day. 2 tabs tid  6/9/19  Yes Ivana Marrero MD   Jardiance 25 MG tablet tablet TAKE 1 TABLET BY MOUTH IN THE MORNING BEFORE BREAKFAST 7/12/21  Yes Marianela Lopes MD   levothyroxine (SYNTHROID, LEVOTHROID) 200 MCG tablet TAKE 1 TABLET BY MOUTH EVERY DAY 7/12/21  Yes Marianela Lopes MD   melatonin 1 MG tablet Take  by mouth Daily.   Yes Ivana Marrero MD   metFORMIN ER (GLUCOPHAGE-XR) 750 MG 24 hr tablet TAKE 1 TABLET BY MOUTH EVERY DAY WITH BREAKFAST 2/23/21  Yes Hannah Oconnor APRN   MULTIPLE VITAMIN PO MULTIVITAMINS TABS 12/9/16  Yes Ivana Marrero MD   naproxen sodium (ALEVE) 220 MG tablet ALEVE 220 MG TABS 12/9/16  Yes Ivana Marrero MD   exenatide er (BYDUREON) 2 MG pen-injector injection Inject 1 pen under the skin into the appropriate area as  directed 1 (One) Time Per Week. 9/20/21   Jovita Conde MD       Lab Results (most recent)     Procedure Component Value Units Date/Time    POC Glucose [[id number]]  (Abnormal) Collected: 09/20/21 1257    Specimen: Blood Updated: 09/20/21 1258     Glucose 167 mg/dL      Comment: Serial Number: [id number]:  [id number]           Lab Results   Component Value Date    HGBA1C 8.2 (H) 09/20/2021    HGBA1C 7.6 (H) 05/03/2021    HGBA1C 8.5 (H) 02/01/2021      Lab Results   Component Value Date    GLUCOSE 196 (H) 09/20/2021    BUN 12 09/20/2021    CREATININE 0.88 09/20/2021    EGFRIFNONA 65 09/20/2021    BCR 13.6 09/20/2021    K 4.5 09/20/2021    CO2 26.2 09/20/2021    CALCIUM 10.0 09/20/2021    ALBUMIN 5.00 09/20/2021    LABIL2 1.4 03/27/2019    AST 25 09/20/2021    ALT 27 09/20/2021    CHOL 278 (H) 09/20/2021     (H) 09/20/2021    HDL 29 (L) 09/20/2021    TRIG 572 (H) 09/20/2021     Lab Results   Component Value Date    TSH 0.569 02/01/2021       Assessment/Plan     Diagnoses and all orders for this visit:    1. Type 2 diabetes mellitus with hyperglycemia, without long-term current use of insulin (CMS/[facility]) (Primary)  -     exenatide er (BYDUREON) 2 MG pen-injector injection; Inject 1 pen under the skin into the appropriate area as directed 1 (One) Time Per Week.  Dispense: 4 pen; Refill: 11  -     Hemoglobin A1c; Future    2. Hyperlipidemia, unspecified hyperlipidemia type  -     Comprehensive Metabolic Panel; Future    3. Hypothyroidism, unspecified type  -     Cancel: TSH  -     T4, Free; Future  -     TSH; Future    4. Vitamin D deficiency  -     Vitamin D 25 Hydroxy; Future    Other orders  -     POC Glucose    Glucose control & lipids worse. Will check thyroid & vit D status next visit.    Increase exercise as able.  Labs drawn today.  Will call you with the lab results. Pt notified.  Continue diabetes, thyroid & vit D supplements.  Restart Bydureon weekly. Sample pens given to pt  Call if blood  sugars are running under 100 or over 200.        Jovita Conde MD  09/25/21  22:29 EDT

## 2021-10-04 ENCOUNTER — TELEPHONE (OUTPATIENT)
Dept: ENDOCRINOLOGY | Facility: CLINIC | Age: 61
End: 2021-10-04

## 2021-10-04 NOTE — TELEPHONE ENCOUNTER
Patient left message stating her pharmacy is unable to get Bydureon in. I spoke with [facility] and the pharmacist states the supplier is not sending it to them but hasn't said why so they are not sure if it is a discontinued product or what is going on. I called patient back and left her a voicemail to either check and see if another pharmacy has it that we can send Rx to or call insurance and see if either Victoza, Trulicity, or Ozempic is covered and then let us know what we need to do.

## 2021-11-01 ENCOUNTER — LAB (OUTPATIENT)
Dept: FAMILY MEDICINE CLINIC | Facility: CLINIC | Age: 61
End: 2021-11-01

## 2021-11-01 DIAGNOSIS — E55.9 VITAMIN D DEFICIENCY: ICD-10-CM

## 2021-11-01 DIAGNOSIS — E03.9 HYPOTHYROIDISM, UNSPECIFIED TYPE: ICD-10-CM

## 2021-11-01 DIAGNOSIS — E78.5 HYPERLIPIDEMIA, UNSPECIFIED HYPERLIPIDEMIA TYPE: ICD-10-CM

## 2021-11-01 DIAGNOSIS — E11.65 TYPE 2 DIABETES MELLITUS WITH HYPERGLYCEMIA, WITHOUT LONG-TERM CURRENT USE OF INSULIN (HCC): ICD-10-CM

## 2021-11-01 LAB
25(OH)D3 SERPL-MCNC: 33.9 NG/ML
ALBUMIN SERPL-MCNC: 4.7 G/DL (ref 3.5–5.2)
ALBUMIN/GLOB SERPL: 1.6 G/DL
ALP SERPL-CCNC: 100 U/L (ref 39–117)
ALT SERPL W P-5'-P-CCNC: 27 U/L (ref 1–33)
ANION GAP SERPL CALCULATED.3IONS-SCNC: 11.7 MMOL/L (ref 5–15)
AST SERPL-CCNC: 20 U/L (ref 1–32)
BILIRUB SERPL-MCNC: 0.3 MG/DL (ref 0–1.2)
BUN SERPL-MCNC: 15 MG/DL (ref 8–23)
BUN/CREAT SERPL: 18.8 (ref 7–25)
CALCIUM SPEC-SCNC: 10 MG/DL (ref 8.6–10.5)
CHLORIDE SERPL-SCNC: 101 MMOL/L (ref 98–107)
CO2 SERPL-SCNC: 24.3 MMOL/L (ref 22–29)
CREAT SERPL-MCNC: 0.8 MG/DL (ref 0.57–1)
GFR SERPL CREATININE-BSD FRML MDRD: 72 ML/MIN/1.73
GLOBULIN UR ELPH-MCNC: 3 GM/DL
GLUCOSE SERPL-MCNC: 175 MG/DL (ref 65–99)
HBA1C MFR BLD: 7.3 % (ref 3.5–5.6)
POTASSIUM SERPL-SCNC: 4.2 MMOL/L (ref 3.5–5.2)
PROT SERPL-MCNC: 7.7 G/DL (ref 6–8.5)
SODIUM SERPL-SCNC: 137 MMOL/L (ref 136–145)
T4 FREE SERPL-MCNC: 1.11 NG/DL (ref 0.93–1.7)
TSH SERPL DL<=0.05 MIU/L-ACNC: 1.92 UIU/ML (ref 0.27–4.2)

## 2021-11-01 PROCEDURE — 84439 ASSAY OF FREE THYROXINE: CPT | Performed by: INTERNAL MEDICINE

## 2021-11-01 PROCEDURE — 36415 COLL VENOUS BLD VENIPUNCTURE: CPT

## 2021-11-01 PROCEDURE — 80053 COMPREHEN METABOLIC PANEL: CPT | Performed by: INTERNAL MEDICINE

## 2021-11-01 PROCEDURE — 84443 ASSAY THYROID STIM HORMONE: CPT | Performed by: INTERNAL MEDICINE

## 2021-11-01 PROCEDURE — 82306 VITAMIN D 25 HYDROXY: CPT | Performed by: INTERNAL MEDICINE

## 2021-11-01 PROCEDURE — 83036 HEMOGLOBIN GLYCOSYLATED A1C: CPT | Performed by: INTERNAL MEDICINE

## 2021-12-14 DIAGNOSIS — E11.65 TYPE 2 DIABETES MELLITUS WITH HYPERGLYCEMIA, WITHOUT LONG-TERM CURRENT USE OF INSULIN (HCC): ICD-10-CM

## 2021-12-14 RX ORDER — METFORMIN HYDROCHLORIDE 750 MG/1
TABLET, EXTENDED RELEASE ORAL
Qty: 90 TABLET | Refills: 2 | Status: SHIPPED | OUTPATIENT
Start: 2021-12-14 | End: 2022-03-21 | Stop reason: SDUPTHER

## 2021-12-14 RX ORDER — BLOOD SUGAR DIAGNOSTIC
STRIP MISCELLANEOUS
Qty: 200 EACH | Refills: 2 | Status: SHIPPED | OUTPATIENT
Start: 2021-12-14 | End: 2022-03-21 | Stop reason: SDUPTHER

## 2022-01-04 DIAGNOSIS — E03.8 OTHER SPECIFIED HYPOTHYROIDISM: ICD-10-CM

## 2022-01-04 DIAGNOSIS — E11.65 TYPE 2 DIABETES MELLITUS WITH HYPERGLYCEMIA, WITHOUT LONG-TERM CURRENT USE OF INSULIN: ICD-10-CM

## 2022-01-04 RX ORDER — EMPAGLIFLOZIN 25 MG/1
TABLET, FILM COATED ORAL
Qty: 90 TABLET | Refills: 1 | Status: SHIPPED | OUTPATIENT
Start: 2022-01-04 | End: 2022-03-21 | Stop reason: SDUPTHER

## 2022-01-04 RX ORDER — LEVOTHYROXINE SODIUM 0.2 MG/1
TABLET ORAL
Qty: 90 TABLET | Refills: 1 | Status: SHIPPED | OUTPATIENT
Start: 2022-01-04 | End: 2022-03-21 | Stop reason: SDUPTHER

## 2022-03-11 DIAGNOSIS — E11.65 TYPE 2 DIABETES MELLITUS WITH HYPERGLYCEMIA, WITHOUT LONG-TERM CURRENT USE OF INSULIN: Primary | ICD-10-CM

## 2022-03-14 ENCOUNTER — LAB (OUTPATIENT)
Dept: LAB | Facility: HOSPITAL | Age: 62
End: 2022-03-14

## 2022-03-14 DIAGNOSIS — E11.65 TYPE 2 DIABETES MELLITUS WITH HYPERGLYCEMIA, WITHOUT LONG-TERM CURRENT USE OF INSULIN: ICD-10-CM

## 2022-03-14 LAB
ALBUMIN SERPL-MCNC: 5.1 G/DL (ref 3.5–5.2)
ALBUMIN/GLOB SERPL: 1.5 G/DL
ALP SERPL-CCNC: 99 U/L (ref 39–117)
ALT SERPL W P-5'-P-CCNC: 79 U/L (ref 1–33)
ANION GAP SERPL CALCULATED.3IONS-SCNC: 14.8 MMOL/L (ref 5–15)
AST SERPL-CCNC: 56 U/L (ref 1–32)
BILIRUB SERPL-MCNC: 0.4 MG/DL (ref 0–1.2)
BUN SERPL-MCNC: 13 MG/DL (ref 8–23)
BUN/CREAT SERPL: 15.1 (ref 7–25)
CALCIUM SPEC-SCNC: 10.7 MG/DL (ref 8.6–10.5)
CHLORIDE SERPL-SCNC: 99 MMOL/L (ref 98–107)
CO2 SERPL-SCNC: 24.2 MMOL/L (ref 22–29)
CREAT SERPL-MCNC: 0.86 MG/DL (ref 0.57–1)
EGFRCR SERPLBLD CKD-EPI 2021: 76 ML/MIN/1.73
GLOBULIN UR ELPH-MCNC: 3.3 GM/DL
GLUCOSE SERPL-MCNC: 201 MG/DL (ref 65–99)
HBA1C MFR BLD: 8.2 % (ref 3.5–5.6)
POTASSIUM SERPL-SCNC: 4.4 MMOL/L (ref 3.5–5.2)
PROT SERPL-MCNC: 8.4 G/DL (ref 6–8.5)
SODIUM SERPL-SCNC: 138 MMOL/L (ref 136–145)

## 2022-03-14 PROCEDURE — 83036 HEMOGLOBIN GLYCOSYLATED A1C: CPT

## 2022-03-14 PROCEDURE — 36415 COLL VENOUS BLD VENIPUNCTURE: CPT

## 2022-03-14 PROCEDURE — 80053 COMPREHEN METABOLIC PANEL: CPT

## 2022-03-21 ENCOUNTER — OFFICE VISIT (OUTPATIENT)
Dept: ENDOCRINOLOGY | Facility: CLINIC | Age: 62
End: 2022-03-21

## 2022-03-21 VITALS
BODY MASS INDEX: 40.25 KG/M2 | HEIGHT: 60 IN | HEART RATE: 78 BPM | SYSTOLIC BLOOD PRESSURE: 116 MMHG | TEMPERATURE: 97.3 F | DIASTOLIC BLOOD PRESSURE: 68 MMHG | WEIGHT: 205 LBS

## 2022-03-21 DIAGNOSIS — E11.65 TYPE 2 DIABETES MELLITUS WITH HYPERGLYCEMIA, WITHOUT LONG-TERM CURRENT USE OF INSULIN: Primary | ICD-10-CM

## 2022-03-21 DIAGNOSIS — E03.9 HYPOTHYROIDISM, UNSPECIFIED TYPE: ICD-10-CM

## 2022-03-21 DIAGNOSIS — E55.9 VITAMIN D DEFICIENCY: ICD-10-CM

## 2022-03-21 DIAGNOSIS — E78.5 HYPERLIPIDEMIA, UNSPECIFIED HYPERLIPIDEMIA TYPE: ICD-10-CM

## 2022-03-21 LAB — GLUCOSE BLDC GLUCOMTR-MCNC: 146 MG/DL (ref 70–105)

## 2022-03-21 PROCEDURE — 82962 GLUCOSE BLOOD TEST: CPT | Performed by: INTERNAL MEDICINE

## 2022-03-21 PROCEDURE — 99214 OFFICE O/P EST MOD 30 MIN: CPT | Performed by: INTERNAL MEDICINE

## 2022-03-21 RX ORDER — DULAGLUTIDE 0.75 MG/.5ML
0.75 INJECTION, SOLUTION SUBCUTANEOUS WEEKLY
Start: 2022-03-21 | End: 2022-04-05 | Stop reason: SDUPTHER

## 2022-03-21 RX ORDER — LEVOTHYROXINE SODIUM 0.2 MG/1
200 TABLET ORAL DAILY
Qty: 90 TABLET | Refills: 3 | Status: SHIPPED | OUTPATIENT
Start: 2022-03-21 | End: 2023-01-23

## 2022-03-21 RX ORDER — METFORMIN HYDROCHLORIDE 750 MG/1
750 TABLET, EXTENDED RELEASE ORAL
Qty: 90 TABLET | Refills: 3 | Status: SHIPPED | OUTPATIENT
Start: 2022-03-21

## 2022-03-21 RX ORDER — FENOFIBRATE 160 MG/1
160 TABLET ORAL
Qty: 90 TABLET | Refills: 3 | Status: SHIPPED | OUTPATIENT
Start: 2022-03-21

## 2022-03-21 RX ORDER — BLOOD SUGAR DIAGNOSTIC
STRIP MISCELLANEOUS
Qty: 200 EACH | Refills: 3 | Status: SHIPPED | OUTPATIENT
Start: 2022-03-21

## 2022-03-21 NOTE — PATIENT INSTRUCTIONS
Drink plenty of water.  Continue exercise.  Find out when last eye exam was.  Start Trulicity 0.75 mg weekly.  Let us know how well you tolerate.  Continue other diabetes, thyroid & lipid meds & vit D supplements.  Call if blood sugars are running over 160.  F/u in 6 months, with fasting labs prior.

## 2022-03-27 NOTE — PROGRESS NOTES
[facility]        Patient Care Team:  Nancy Cain MD as PCP - General (Family Medicine)  Felisha Culver MD (Psychiatry)  Addy Vu MD as Consulting Physician (Pulmonary Disease)    Chief Complaint:    Chief Complaint   Patient presents with   • Diabetes     Type 2, , fasting         Subjective   Here for diabetes f/u  Blood sugars: in the high 100's  Exercise program: walking  Taking vit D    Interval History:     Patient Complaints: off Bydureon several months due to insurance nolonger covering  Patient Denies:  hypoglycemia  History taken from: patient    Review of Systems:   Review of Systems   Eyes: Negative for blurred vision.   Gastrointestinal: Negative for nausea.   Endocrine: Negative for polyuria.   Neurological: Negative for headache.     Lost  13 lb since last visit    Objective     Vital Signs      Vitals:    03/21/22 1102   BP: 116/68   Pulse: 78   Temp: 97.3 °F (36.3 °C)         Physical Exam:     General Appearance:    Alert, cooperative, in no acute distress. Obese   Head:    Normocephalic, without obvious abnormality, atraumatic   Eyes:            Lids and lashes normal, conjunctivae and sclerae normal, no   icterus, no pallor, corneas clear, PERRLA   Throat:   No oral lesions,  oral mucosa moist   Neck:   No adenopathy, supple,  no thyromegaly, no   carotid bruit   Lungs:     Decreased breath sounds    Heart:    Regular rhythm and normal rate   Chest Wall:    No abnormalities observed   Abdomen:     Normal bowel sounds, soft                 Extremities:   Moves all extremities well, no edema               Pulses:   Pulses palpable and equal bilaterally   Skin:   Dry. Scars on both knees from previous surgery   Neurologic:  DTR absent, able to feel the 10g monofilament          Results Review:    I have reviewed the patient's new clinical results, labs & imaging.    Medication Review:   Prior to Admission medications    Medication Sig Start Date  End Date Taking? Authorizing Provider   ACCU-CHEK FASTCLIX LANCETS [facility] Pt to use to check her blood sugars 3/16/20  Yes Jovita Conde MD   ARIPiprazole (ABILIFY) 5 MG tablet  9/26/19  Yes Ivana Marrero MD   Biotin 73156 MCG tablet dispersible Place  on the tongue.   Yes Ivana Marrero MD   Blood Glucose Monitoring Suppl (ACCU-CHEK GUIDE ME) w/Device kit 1 each by Other route Take As Directed.   2/5/20  Yes Ivana Marrero MD   busPIRone (BUSPAR) 15 MG tablet Take 15 mg by mouth 4 (Four) Times a Day. prn 7/30/19  Yes Ivana Marrero MD   carBAMazepine (TEGretol) 200 MG tablet 200 mg 4 (Four) Times a Day. 7/30/19  Yes Ivana Marrero MD   Cholecalciferol (VITAMIN D3) 5000 units capsule capsule Take 5,000 Units by mouth Daily.   Yes Ivana Marrero MD   empagliflozin (Jardiance) 25 MG tablet tablet Take 1 tablet by mouth Every Morning Before Breakfast. 3/21/22  Yes Jovita Conde MD   fenofibrate 160 MG tablet Take 1 tablet by mouth every night at bedtime. 3/21/22  Yes oJvita Conde MD   gabapentin (NEURONTIN) 600 MG tablet Take 600 mg by mouth 2 (Two) Times a Day. 2 tabs tid  6/9/19  Yes Ivana Marrero MD   glucose blood (Accu-Chek Guide) test strip TEST BLOOD SUGAR TWICE A DAY. DX: E11.9 3/21/22  Yes Jovita Conde MD   levothyroxine (SYNTHROID, LEVOTHROID) 200 MCG tablet Take 1 tablet by mouth Daily. 3/21/22  Yes Jovita Conde MD   melatonin 1 MG tablet Take  by mouth Daily.   Yes Ivana Marrero MD   metFORMIN ER (GLUCOPHAGE-XR) 750 MG 24 hr tablet Take 1 tablet by mouth Daily With Breakfast. 3/21/22  Yes Jovita Conde MD   MULTIPLE VITAMIN PO MULTIVITAMINS TABS 12/9/16  Yes Ivana Marrero MD   naproxen sodium (ALEVE) 220 MG tablet ALEVE 220 MG TABS 12/9/16  Yes Ivana Marrero MD   Dulaglutide (Trulicity) 0.75 MG/0.5ML solution pen-injector Inject 0.75 mg under the skin into the appropriate area as directed 1 (One) Time  Per Week. 3/21/22   Jovita Conde MD       Lab Results (most recent)     Procedure Component Value Units Date/Time    POC Glucose [[id number]]  (Abnormal) Collected: 03/21/22 1106    Specimen: Blood Updated: 03/21/22 1107     Glucose 146 mg/dL      Comment: Serial Number: [id number]:  [id number]           Lab Results   Component Value Date    HGBA1C 8.2 (H) 03/14/2022    HGBA1C 7.3 (H) 11/01/2021    HGBA1C 8.2 (H) 09/20/2021      Lab Results   Component Value Date    GLUCOSE 201 (H) 03/14/2022    BUN 13 03/14/2022    CREATININE 0.86 03/14/2022    EGFRIFNONA 72 11/01/2021    BCR 15.1 03/14/2022    K 4.4 03/14/2022    CO2 24.2 03/14/2022    CALCIUM 10.7 (H) 03/14/2022    ALBUMIN 5.10 03/14/2022    LABIL2 1.4 03/27/2019    AST 56 (H) 03/14/2022    ALT 79 (H) 03/14/2022    CHOL 278 (H) 09/20/2021     (H) 09/20/2021    HDL 29 (L) 09/20/2021    TRIG 572 (H) 09/20/2021     Lab Results   Component Value Date    TSH 1.920 11/01/2021    FREET4 1.11 11/01/2021    RHSO96KL 33.9 11/01/2021       Assessment/Plan     Diagnoses and all orders for this visit:    1. Type 2 diabetes mellitus with hyperglycemia, without long-term current use of insulin (HCC) (Primary)  -     glucose blood (Accu-Chek Guide) test strip; TEST BLOOD SUGAR TWICE A DAY. DX: E11.9  Dispense: 200 each; Refill: 3  -     empagliflozin (Jardiance) 25 MG tablet tablet; Take 1 tablet by mouth Every Morning Before Breakfast.  Dispense: 90 tablet; Refill: 3  -     metFORMIN ER (GLUCOPHAGE-XR) 750 MG 24 hr tablet; Take 1 tablet by mouth Daily With Breakfast.  Dispense: 90 tablet; Refill: 3  -     Dulaglutide (Trulicity) 0.75 MG/0.5ML solution pen-injector; Inject 0.75 mg under the skin into the appropriate area as directed 1 (One) Time Per Week.  -     Hemoglobin A1c; Future  -     Microalbumin / Creatinine Urine Ratio - Urine, Clean Catch; Future    2. Vitamin D deficiency  -     Vitamin D 25 Hydroxy; Future    3. Hypothyroidism, unspecified type  -      levothyroxine (SYNTHROID, LEVOTHROID) 200 MCG tablet; Take 1 tablet by mouth Daily.  Dispense: 90 tablet; Refill: 3  -     TSH; Future  -     T4, Free; Future    4. Hyperlipidemia, unspecified hyperlipidemia type  -     fenofibrate 160 MG tablet; Take 1 tablet by mouth every night at bedtime.  Dispense: 90 tablet; Refill: 3  -     Comprehensive Metabolic Panel; Future  -     Lipid Panel; Future    Other orders  -     POC Glucose    Glucose control worse. Will check lipid, thyroid & vit D status next visit.    Drink plenty of water.  Continue exercise.  Find out when last eye exam was.  Start Trulicity 0.75 mg weekly. Sample given  Let us know how well you tolerate.  Continue other diabetes, thyroid & lipid meds & vit D supplements.  Call if blood sugars are running over 160.        Jovita Conde MD  03/26/22  22:03 EDT

## 2022-04-05 DIAGNOSIS — E11.65 TYPE 2 DIABETES MELLITUS WITH HYPERGLYCEMIA, WITHOUT LONG-TERM CURRENT USE OF INSULIN: ICD-10-CM

## 2022-04-05 RX ORDER — DULAGLUTIDE 0.75 MG/.5ML
0.75 INJECTION, SOLUTION SUBCUTANEOUS WEEKLY
Qty: 2 ML | Refills: 13 | Status: SHIPPED | OUTPATIENT
Start: 2022-04-05

## 2022-10-10 ENCOUNTER — LAB (OUTPATIENT)
Dept: LAB | Facility: HOSPITAL | Age: 62
End: 2022-10-10

## 2022-10-10 DIAGNOSIS — E78.5 HYPERLIPIDEMIA, UNSPECIFIED HYPERLIPIDEMIA TYPE: ICD-10-CM

## 2022-10-10 DIAGNOSIS — E55.9 VITAMIN D DEFICIENCY: ICD-10-CM

## 2022-10-10 DIAGNOSIS — E03.9 HYPOTHYROIDISM, UNSPECIFIED TYPE: ICD-10-CM

## 2022-10-10 DIAGNOSIS — E11.65 TYPE 2 DIABETES MELLITUS WITH HYPERGLYCEMIA, WITHOUT LONG-TERM CURRENT USE OF INSULIN: ICD-10-CM

## 2022-10-10 LAB
25(OH)D3 SERPL-MCNC: 36 NG/ML (ref 30–100)
ALBUMIN SERPL-MCNC: 5.2 G/DL (ref 3.5–5.2)
ALBUMIN UR-MCNC: 2.6 MG/DL
ALBUMIN/GLOB SERPL: 2 G/DL
ALP SERPL-CCNC: 124 U/L (ref 39–117)
ALT SERPL W P-5'-P-CCNC: 23 U/L (ref 1–33)
ANION GAP SERPL CALCULATED.3IONS-SCNC: 12.1 MMOL/L (ref 5–15)
AST SERPL-CCNC: 22 U/L (ref 1–32)
BILIRUB SERPL-MCNC: 0.3 MG/DL (ref 0–1.2)
BUN SERPL-MCNC: 18 MG/DL (ref 8–23)
BUN/CREAT SERPL: 24.3 (ref 7–25)
CALCIUM SPEC-SCNC: 10.3 MG/DL (ref 8.6–10.5)
CHLORIDE SERPL-SCNC: 100 MMOL/L (ref 98–107)
CHOLEST SERPL-MCNC: 243 MG/DL (ref 0–200)
CO2 SERPL-SCNC: 26.9 MMOL/L (ref 22–29)
CREAT SERPL-MCNC: 0.74 MG/DL (ref 0.57–1)
CREAT UR-MCNC: 38.8 MG/DL
EGFRCR SERPLBLD CKD-EPI 2021: 90.5 ML/MIN/1.73
GLOBULIN UR ELPH-MCNC: 2.6 GM/DL
GLUCOSE SERPL-MCNC: 172 MG/DL (ref 65–99)
HBA1C MFR BLD: 7.4 % (ref 3.5–5.6)
HDLC SERPL-MCNC: 63 MG/DL (ref 40–60)
LDLC SERPL CALC-MCNC: 145 MG/DL (ref 0–100)
LDLC/HDLC SERPL: 2.23 {RATIO}
MICROALBUMIN/CREAT UR: 67 MG/G
POTASSIUM SERPL-SCNC: 4.6 MMOL/L (ref 3.5–5.2)
PROT SERPL-MCNC: 7.8 G/DL (ref 6–8.5)
SODIUM SERPL-SCNC: 139 MMOL/L (ref 136–145)
T4 FREE SERPL-MCNC: 1.23 NG/DL (ref 0.93–1.7)
TRIGL SERPL-MCNC: 197 MG/DL (ref 0–150)
TSH SERPL DL<=0.05 MIU/L-ACNC: 1.23 UIU/ML (ref 0.27–4.2)
VLDLC SERPL-MCNC: 35 MG/DL (ref 5–40)

## 2022-10-10 PROCEDURE — 82570 ASSAY OF URINE CREATININE: CPT

## 2022-10-10 PROCEDURE — 36415 COLL VENOUS BLD VENIPUNCTURE: CPT

## 2022-10-10 PROCEDURE — 80061 LIPID PANEL: CPT

## 2022-10-10 PROCEDURE — 82306 VITAMIN D 25 HYDROXY: CPT

## 2022-10-10 PROCEDURE — 83036 HEMOGLOBIN GLYCOSYLATED A1C: CPT

## 2022-10-10 PROCEDURE — 84443 ASSAY THYROID STIM HORMONE: CPT

## 2022-10-10 PROCEDURE — 80053 COMPREHEN METABOLIC PANEL: CPT

## 2022-10-10 PROCEDURE — 84439 ASSAY OF FREE THYROXINE: CPT

## 2022-10-10 PROCEDURE — 82043 UR ALBUMIN QUANTITATIVE: CPT

## 2022-10-17 ENCOUNTER — OFFICE VISIT (OUTPATIENT)
Dept: ENDOCRINOLOGY | Facility: CLINIC | Age: 62
End: 2022-10-17

## 2022-10-17 VITALS
HEIGHT: 60 IN | HEART RATE: 71 BPM | DIASTOLIC BLOOD PRESSURE: 62 MMHG | SYSTOLIC BLOOD PRESSURE: 134 MMHG | WEIGHT: 209 LBS | BODY MASS INDEX: 41.03 KG/M2 | OXYGEN SATURATION: 98 %

## 2022-10-17 DIAGNOSIS — E03.9 HYPOTHYROIDISM, UNSPECIFIED TYPE: ICD-10-CM

## 2022-10-17 DIAGNOSIS — E55.9 VITAMIN D DEFICIENCY: ICD-10-CM

## 2022-10-17 DIAGNOSIS — E11.65 TYPE 2 DIABETES MELLITUS WITH HYPERGLYCEMIA, WITHOUT LONG-TERM CURRENT USE OF INSULIN: Primary | ICD-10-CM

## 2022-10-17 DIAGNOSIS — E78.2 MIXED HYPERLIPIDEMIA: ICD-10-CM

## 2022-10-17 LAB — GLUCOSE BLDC GLUCOMTR-MCNC: 166 MG/DL (ref 70–105)

## 2022-10-17 PROCEDURE — 82962 GLUCOSE BLOOD TEST: CPT | Performed by: INTERNAL MEDICINE

## 2022-10-17 PROCEDURE — 99214 OFFICE O/P EST MOD 30 MIN: CPT | Performed by: INTERNAL MEDICINE

## 2022-10-17 RX ORDER — ARIPIPRAZOLE 15 MG/1
TABLET ORAL
COMMUNITY
Start: 2022-10-03

## 2022-11-12 NOTE — PROGRESS NOTES
[facility]        Patient Care Team:  Nancy Cain MD as PCP - General (Family Medicine)  Felisha Culver MD (Psychiatry)  Addy Vu MD as Consulting Physician (Pulmonary Disease)    Chief Complaint:    Chief Complaint   Patient presents with   • Diabetes     FU DM2 / HYPOTHYROID- / FASTING         Subjective   Here for diabetes f/u  Blood sugars: in the mid 100's  Exercise program: walking  Taking vit D    Interval History:     Patient Complaints: none  Patient Denies: hypoglycemia  History taken from: patient    Review of Systems:   Review of Systems   Eyes: Negative for blurred vision.   Gastrointestinal: Negative for nausea.   Endocrine: Negative for polyuria.   Neurological: Negative for headache.     Gained 4 lb since last visit    Objective     Vital Signs      Vitals:    10/17/22 1112   BP: 134/62   Pulse: 71   SpO2: 98%           Physical Exam:     General Appearance:    Alert, cooperative, in no acute distress. Obese   Head:    Normocephalic, without obvious abnormality, atraumatic   Eyes:            Lids and lashes normal, conjunctivae and sclerae normal, no   icterus, no pallor, corneas clear, PERRLA   Throat:   No oral lesions,  oral mucosa moist   Neck:   No adenopathy, supple,  no thyromegaly, no   carotid bruit   Lungs:     Decreased breath sounds    Heart:    Regular rhythm and normal rate   Chest Wall:    No abnormalities observed   Abdomen:     Normal bowel sounds, soft                 Extremities:   Scars on knees from previous surgeries, no edema               Pulses:   Pulses palpable and equal bilaterally   Skin:   Dry. Plantar calluses   Neurologic:  DTR absent in ankles, vibratory sense 25% decreased in toes, able to feel the 10g monofilament ( same as 1y ago )            Results Review:    I have reviewed the patient's new clinical results, labs & imaging.    Medication Review:   Prior to Admission medications    Medication Sig Start Date  End Date Taking? Authorizing Provider   ACCU-CHEK FASTCLIX LANCETS [facility] Pt to use to check her blood sugars 3/16/20  Yes Jovita Conde MD   ARIPiprazole (ABILIFY) 5 MG tablet  9/26/19  Yes Ivana Marrero MD   Biotin 11021 MCG tablet dispersible Place  on the tongue.   Yes Ivana Marrero MD   Blood Glucose Monitoring Suppl (ACCU-CHEK GUIDE ME) w/Device kit 1 each by Other route Take As Directed.   2/5/20  Yes Ivana Marrero MD   busPIRone (BUSPAR) 15 MG tablet Take 15 mg by mouth 4 (Four) Times a Day. prn 7/30/19  Yes Ivana Marrero MD   carBAMazepine (TEGretol) 200 MG tablet 200 mg 4 (Four) Times a Day. 7/30/19  Yes Ivana Marrero MD   Cholecalciferol (VITAMIN D3) 5000 units capsule capsule Take 5,000 Units by mouth Daily.   Yes Ivana Marrero MD   Dulaglutide (Trulicity) 0.75 MG/0.5ML solution pen-injector Inject 0.75 mg under the skin into the appropriate area as directed 1 (One) Time Per Week. 4/5/22  Yes Jovita Conde MD   empagliflozin (Jardiance) 25 MG tablet tablet Take 1 tablet by mouth Every Morning Before Breakfast. 3/21/22  Yes Jovita Conde MD   fenofibrate 160 MG tablet Take 1 tablet by mouth every night at bedtime. 3/21/22  Yes Jovita Conde MD   gabapentin (NEURONTIN) 600 MG tablet Take 600 mg by mouth 2 (Two) Times a Day. 2 tabs tid  6/9/19  Yes Ivana Marrero MD   glucose blood (Accu-Chek Guide) test strip TEST BLOOD SUGAR TWICE A DAY. DX: E11.9 3/21/22  Yes Jovita Conde MD   levothyroxine (SYNTHROID, LEVOTHROID) 200 MCG tablet Take 1 tablet by mouth Daily. 3/21/22  Yes Jovita Conde MD   melatonin 1 MG tablet Take  by mouth Daily.   Yes Ivana Marrero MD   metFORMIN ER (GLUCOPHAGE-XR) 750 MG 24 hr tablet Take 1 tablet by mouth Daily With Breakfast. 3/21/22  Yes Jovita Conde MD   MULTIPLE VITAMIN PO MULTIVITAMINS TABS 12/9/16  Yes Ivana Marrero MD   naproxen sodium (ALEVE) 220 MG tablet ALEVE 220 MG  TABS 12/9/16  Yes Provider, MD Ivana   ARIPiprazole (ABILIFY) 15 MG tablet TAKE ONE HALF TO ONE (1) TABLET BY MOUTH DAILY 10/3/22   ProviderIvana MD       Lab Results (most recent)     Procedure Component Value Units Date/Time    POC Glucose [[id number]]  (Abnormal) Collected: 10/17/22 1118    Specimen: Blood Updated: 10/17/22 1119     Glucose 166 mg/dL      Comment: Serial Number: [id number]:  [id number]           Lab Results   Component Value Date    HGBA1C 7.4 (H) 10/10/2022    HGBA1C 8.2 (H) 03/14/2022    HGBA1C 7.3 (H) 11/01/2021      Lab Results   Component Value Date    GLUCOSE 172 (H) 10/10/2022    BUN 18 10/10/2022    CREATININE 0.74 10/10/2022    EGFRIFNONA 72 11/01/2021    BCR 24.3 10/10/2022    K 4.6 10/10/2022    CO2 26.9 10/10/2022    CALCIUM 10.3 10/10/2022    ALBUMIN 5.20 10/10/2022    LABIL2 1.4 03/27/2019    AST 22 10/10/2022    ALT 23 10/10/2022    CHOL 243 (H) 10/10/2022     (H) 10/10/2022    HDL 63 (H) 10/10/2022    TRIG 197 (H) 10/10/2022     Lab Results   Component Value Date    TSH 1.230 10/10/2022    FREET4 1.23 10/10/2022    HKRK16OZ 36.0 10/10/2022     Microalb/cr ratio 67.0    Assessment & Plan     Diagnoses and all orders for this visit:    1. Type 2 diabetes mellitus with hyperglycemia, without long-term current use of insulin (HCC) (Primary)  -     Comprehensive Metabolic Panel; Future  -     Hemoglobin A1c; Future    2. Hypothyroidism, unspecified type    3. Vitamin D deficiency    4. Mixed hyperlipidemia    Other orders  -     POC Glucose    Glucose control & lipids improved. Vit D & thyroid stable.     Continue exercise.  Continue diabetes & thyroid meds & vit D supplements.  Call if blood sugars are running under 100 or over 200.        Jovita Conde MD  11/11/22  23:05 EST

## 2023-01-21 DIAGNOSIS — E03.9 HYPOTHYROIDISM, UNSPECIFIED TYPE: ICD-10-CM

## 2023-01-21 DIAGNOSIS — E11.65 TYPE 2 DIABETES MELLITUS WITH HYPERGLYCEMIA, WITHOUT LONG-TERM CURRENT USE OF INSULIN: ICD-10-CM

## 2023-01-23 RX ORDER — EMPAGLIFLOZIN 25 MG/1
TABLET, FILM COATED ORAL
Qty: 90 TABLET | Refills: 2 | Status: SHIPPED | OUTPATIENT
Start: 2023-01-23

## 2023-01-23 RX ORDER — LEVOTHYROXINE SODIUM 0.2 MG/1
TABLET ORAL
Qty: 90 TABLET | Refills: 2 | Status: SHIPPED | OUTPATIENT
Start: 2023-01-23

## 2023-04-24 ENCOUNTER — LAB (OUTPATIENT)
Dept: LAB | Facility: HOSPITAL | Age: 63
End: 2023-04-24
Payer: COMMERCIAL

## 2023-04-24 DIAGNOSIS — E11.65 TYPE 2 DIABETES MELLITUS WITH HYPERGLYCEMIA, WITHOUT LONG-TERM CURRENT USE OF INSULIN: ICD-10-CM

## 2023-04-24 LAB
ALBUMIN SERPL-MCNC: 4.4 G/DL (ref 3.5–5.2)
ALBUMIN/GLOB SERPL: 1.4 G/DL
ALP SERPL-CCNC: 101 U/L (ref 39–117)
ALT SERPL W P-5'-P-CCNC: 38 U/L (ref 1–33)
ANION GAP SERPL CALCULATED.3IONS-SCNC: 14.3 MMOL/L (ref 5–15)
AST SERPL-CCNC: 38 U/L (ref 1–32)
BILIRUB SERPL-MCNC: 0.4 MG/DL (ref 0–1.2)
BUN SERPL-MCNC: 11 MG/DL (ref 8–23)
BUN/CREAT SERPL: 13.6 (ref 7–25)
CALCIUM SPEC-SCNC: 10.2 MG/DL (ref 8.6–10.5)
CHLORIDE SERPL-SCNC: 101 MMOL/L (ref 98–107)
CO2 SERPL-SCNC: 22.7 MMOL/L (ref 22–29)
CREAT SERPL-MCNC: 0.81 MG/DL (ref 0.57–1)
EGFRCR SERPLBLD CKD-EPI 2021: 81.2 ML/MIN/1.73
GLOBULIN UR ELPH-MCNC: 3.2 GM/DL
GLUCOSE SERPL-MCNC: 193 MG/DL (ref 65–99)
HBA1C MFR BLD: 11.1 % (ref 4.8–5.6)
POTASSIUM SERPL-SCNC: 4.5 MMOL/L (ref 3.5–5.2)
PROT SERPL-MCNC: 7.6 G/DL (ref 6–8.5)
SODIUM SERPL-SCNC: 138 MMOL/L (ref 136–145)

## 2023-04-24 PROCEDURE — 36415 COLL VENOUS BLD VENIPUNCTURE: CPT

## 2023-04-24 PROCEDURE — 80053 COMPREHEN METABOLIC PANEL: CPT

## 2023-04-24 PROCEDURE — 83036 HEMOGLOBIN GLYCOSYLATED A1C: CPT

## 2023-04-27 RX ORDER — CARIPRAZINE 3 MG/1
1 CAPSULE, GELATIN COATED ORAL DAILY
COMMUNITY
Start: 2023-03-23

## 2023-04-30 DIAGNOSIS — E11.65 TYPE 2 DIABETES MELLITUS WITH HYPERGLYCEMIA, WITHOUT LONG-TERM CURRENT USE OF INSULIN: ICD-10-CM

## 2023-05-01 ENCOUNTER — OFFICE VISIT (OUTPATIENT)
Dept: ENDOCRINOLOGY | Facility: CLINIC | Age: 63
End: 2023-05-01
Payer: COMMERCIAL

## 2023-05-01 VITALS
WEIGHT: 204.3 LBS | HEIGHT: 60 IN | DIASTOLIC BLOOD PRESSURE: 80 MMHG | HEART RATE: 86 BPM | BODY MASS INDEX: 40.11 KG/M2 | SYSTOLIC BLOOD PRESSURE: 136 MMHG

## 2023-05-01 DIAGNOSIS — E78.2 MIXED HYPERLIPIDEMIA: ICD-10-CM

## 2023-05-01 DIAGNOSIS — E03.9 HYPOTHYROIDISM, UNSPECIFIED TYPE: ICD-10-CM

## 2023-05-01 DIAGNOSIS — E55.9 VITAMIN D DEFICIENCY: ICD-10-CM

## 2023-05-01 DIAGNOSIS — E11.65 TYPE 2 DIABETES MELLITUS WITH HYPERGLYCEMIA, WITHOUT LONG-TERM CURRENT USE OF INSULIN: Primary | ICD-10-CM

## 2023-05-01 LAB — GLUCOSE BLDC GLUCOMTR-MCNC: 282 MG/DL (ref 70–105)

## 2023-05-01 PROCEDURE — 82948 REAGENT STRIP/BLOOD GLUCOSE: CPT | Performed by: INTERNAL MEDICINE

## 2023-05-01 RX ORDER — DULAGLUTIDE 0.75 MG/.5ML
0.75 INJECTION, SOLUTION SUBCUTANEOUS WEEKLY
Refills: 13 | OUTPATIENT
Start: 2023-05-01

## 2023-05-01 NOTE — PATIENT INSTRUCTIONS
See eye doctor.  Increase exercise as planned.  Drink plenty of water.  Take extra shot of Trulicity when you get home to make sure you tolerate.  Let us so we can send prescription for 1.5 mg weekly.  Continue other diabetes & chol meds & multivitamins.  Call if blood sugars are running under 100 or over 200.  F/u in 6 months, with fasting labs prior.

## 2023-05-06 NOTE — PROGRESS NOTES
[facility]        Patient Care Team:  Nancy Cain MD as PCP - General (Family Medicine)  Felisha Culver MD (Psychiatry)  Addy Vu MD as Consulting Physician (Pulmonary Disease)    Chief Complaint:    Chief Complaint   Patient presents with   • Diabetes     Type 2, , 2.5 hr PP, ketones negative         Subjective   Here for diabetes f/u  Blood sugars: in the 200's  Exercise program: walking some  Taking multivitamins    Interval History:     Patient Complaints: none  Patient Denies: hypoglycemia  History taken from: patient    Review of Systems:   Review of Systems   HENT: Positive for hearing loss.    Eyes: Negative for blurred vision.   Gastrointestinal: Negative for nausea.   Endocrine: Negative for polyuria.   Neurological: Negative for headache.     Lost  5 lb since last visit    Objective     Vital Signs      Vitals:    05/01/23 1440   BP: 136/80   Pulse: 86         Physical Exam:     General Appearance:    Alert, cooperative, in no acute distress. Obese   Head:    Normocephalic, hearing aids   Eyes:            Lids and lashes normal, conjunctivae and sclerae normal, no   icterus, no pallor, corneas clear, PERRLA   Throat:   No oral lesions,  oral mucosa moist   Neck:   No adenopathy, supple,  no thyromegaly, no   carotid bruit   Lungs:     Clear     Heart:    Regular rhythm and normal rate   Chest Wall:    No abnormalities observed   Abdomen:     Normal bowel sounds, soft                 Extremities:   Moves all extremities well, no edema               Pulses:   Pulses palpable and equal bilaterally   Skin:   Dry. Plantar calluses   Neurologic:  DTR absent, able to feel the 10g monofilament            Results Review:    I have reviewed the patient's new clinical results, labs & imaging.    Medication Review:   Prior to Admission medications    Medication Sig Start Date End Date Taking? Authorizing Provider   ACCU-CHEK FASTCLIX LANCETS [facility] Pt to use to  check her blood sugars 3/16/20  Yes Jovita Conde MD   Biotin 00585 MCG tablet dispersible Place  on the tongue.   Yes Ivana Marrero MD   Blood Glucose Monitoring Suppl (ACCU-CHEK GUIDE ME) w/Device kit 1 each by Other route Take As Directed.   2/5/20  Yes Ivana Marrero MD   busPIRone (BUSPAR) 15 MG tablet Take 1 tablet by mouth 4 (Four) Times a Day. prn 7/30/19  Yes Ivana Marrero MD   carBAMazepine (TEGretol) 200 MG tablet 1 tablet 4 (Four) Times a Day. 7/30/19  Yes Ivana Marrero MD   Cholecalciferol (VITAMIN D3) 5000 units capsule capsule Take 1 capsule by mouth Daily.   Yes Ivana Marrero MD   Dulaglutide (Trulicity) 0.75 MG/0.5ML solution pen-injector Inject 0.75 mg under the skin into the appropriate area as directed 1 (One) Time Per Week. 4/5/22  Yes Jovita Conde MD   fenofibrate 160 MG tablet Take 1 tablet by mouth every night at bedtime. 3/21/22  Yes Jovita Conde MD   gabapentin (NEURONTIN) 600 MG tablet Take 1 tablet by mouth 2 (Two) Times a Day. 2 tabs tid 6/9/19  Yes Ivana Marrero MD   glucose blood (Accu-Chek Guide) test strip TEST BLOOD SUGAR TWICE A DAY. DX: E11.9 3/21/22  Yes Jovita Conde MD   Jardiance 25 MG tablet tablet TAKE 1 TABLET BY MOUTH EVERY DAY BEFORE BREAKFAST 1/23/23  Yes Jovita Conde MD   levothyroxine (SYNTHROID, LEVOTHROID) 200 MCG tablet TAKE 1 TABLET BY MOUTH EVERY DAY 1/23/23  Yes Jovita Conde MD   melatonin 1 MG tablet Take  by mouth Daily.   Yes Ivana Marrero MD   metFORMIN ER (GLUCOPHAGE-XR) 750 MG 24 hr tablet Take 1 tablet by mouth Daily With Breakfast. 3/21/22  Yes Jovita Conde MD   MULTIPLE VITAMIN PO MULTIVITAMINS TABS 12/9/16  Yes Ivana Marrero MD   naproxen sodium (ALEVE) 220 MG tablet ALEVE 220 MG TABS 12/9/16  Yes Ivana Marrero MD   Vraylar 3 MG capsule capsule Take 1 capsule by mouth Daily. 3/23/23  Yes Provider, Historical, MD       Lab Results (most recent)      Procedure Component Value Units Date/Time    POC Glucose [[id number]]  (Abnormal) Collected: 05/01/23 1444    Specimen: Blood Updated: 05/01/23 1446     Glucose 282 mg/dL      Comment: Serial Number: [id number]:  [id number]           Lab Results   Component Value Date    HGBA1C 11.10 (H) 04/24/2023    HGBA1C 7.4 (H) 10/10/2022    HGBA1C 8.2 (H) 03/14/2022      Lab Results   Component Value Date    GLUCOSE 193 (H) 04/24/2023    BUN 11 04/24/2023    CREATININE 0.81 04/24/2023    EGFRIFNONA 72 11/01/2021    BCR 13.6 04/24/2023    K 4.5 04/24/2023    CO2 22.7 04/24/2023    CALCIUM 10.2 04/24/2023    ALBUMIN 4.4 04/24/2023    LABIL2 1.4 03/27/2019    AST 38 (H) 04/24/2023    ALT 38 (H) 04/24/2023    CHOL 243 (H) 10/10/2022     (H) 10/10/2022    HDL 63 (H) 10/10/2022    TRIG 197 (H) 10/10/2022     Lab Results   Component Value Date    TSH 1.230 10/10/2022    FREET4 1.23 10/10/2022    GPYV36ZN 36.0 10/10/2022       Assessment & Plan     Diagnoses and all orders for this visit:    1. Type 2 diabetes mellitus with hyperglycemia, without long-term current use of insulin (Primary)  -     Hemoglobin A1c; Future  -     Microalbumin / Creatinine Urine Ratio - Urine, Clean Catch; Future    2. Hypothyroidism, unspecified type  -     TSH; Future  -     T4, Free; Future    3. Mixed hyperlipidemia  -     Comprehensive Metabolic Panel; Future  -     Lipid Panel; Future    4. Vitamin D deficiency  -     Vitamin D,25-Hydroxy; Future    Other orders  -     POC Glucose    Glucose control worse. Will check lipid, thyroid & vit D status next visit.    See eye doctor.  Increase exercise as planned.  Drink plenty of water.  Take extra shot of Trulicity when you get home to make sure you tolerate.  Let us so we can send prescription for 1.5 mg weekly.  Continue other diabetes & chol meds & multivitamins.  Call if blood sugars are running under 100 or over 200.        Vasti Broadstone, MD  05/06/23  18:18 EDT

## 2023-05-08 ENCOUNTER — TELEPHONE (OUTPATIENT)
Dept: ENDOCRINOLOGY | Facility: CLINIC | Age: 63
End: 2023-05-08
Payer: COMMERCIAL

## 2023-05-08 DIAGNOSIS — E11.65 TYPE 2 DIABETES MELLITUS WITH HYPERGLYCEMIA, WITHOUT LONG-TERM CURRENT USE OF INSULIN: Primary | ICD-10-CM

## 2023-05-08 RX ORDER — DULAGLUTIDE 1.5 MG/.5ML
1.5 INJECTION, SOLUTION SUBCUTANEOUS
Qty: 6 ML | Refills: 3 | Status: SHIPPED | OUTPATIENT
Start: 2023-05-08

## 2023-05-08 NOTE — TELEPHONE ENCOUNTER
Patient left VM she took the higher dose of Trulicity and did not have any issues with it and to send new Rx for it into [facility]

## 2023-06-06 DIAGNOSIS — E11.65 TYPE 2 DIABETES MELLITUS WITH HYPERGLYCEMIA, WITHOUT LONG-TERM CURRENT USE OF INSULIN: ICD-10-CM

## 2023-06-06 RX ORDER — METFORMIN HYDROCHLORIDE 750 MG/1
TABLET, EXTENDED RELEASE ORAL
Qty: 90 TABLET | Refills: 3 | Status: SHIPPED | OUTPATIENT
Start: 2023-06-06

## 2023-06-15 DIAGNOSIS — E78.5 HYPERLIPIDEMIA, UNSPECIFIED HYPERLIPIDEMIA TYPE: ICD-10-CM

## 2023-06-15 DIAGNOSIS — E11.65 TYPE 2 DIABETES MELLITUS WITH HYPERGLYCEMIA, WITHOUT LONG-TERM CURRENT USE OF INSULIN: ICD-10-CM

## 2023-06-15 RX ORDER — DULAGLUTIDE 0.75 MG/.5ML
0.75 INJECTION, SOLUTION SUBCUTANEOUS WEEKLY
Refills: 13 | OUTPATIENT
Start: 2023-06-15

## 2023-06-15 RX ORDER — FENOFIBRATE 160 MG/1
TABLET ORAL
Qty: 90 TABLET | Refills: 3 | Status: SHIPPED | OUTPATIENT
Start: 2023-06-15

## 2023-09-11 ENCOUNTER — TELEPHONE (OUTPATIENT)
Dept: ENDOCRINOLOGY | Facility: CLINIC | Age: 63
End: 2023-09-11

## 2023-09-11 DIAGNOSIS — E11.65 TYPE 2 DIABETES MELLITUS WITH HYPERGLYCEMIA, WITHOUT LONG-TERM CURRENT USE OF INSULIN: ICD-10-CM

## 2023-09-11 RX ORDER — BLOOD SUGAR DIAGNOSTIC
STRIP MISCELLANEOUS
Qty: 200 EACH | Refills: 3 | Status: SHIPPED | OUTPATIENT
Start: 2023-09-11

## 2023-09-11 NOTE — TELEPHONE ENCOUNTER
Caller: Walsh Mary Grace K    Relationship: Self    Best call back number: [phone]     Requested Prescriptions:      ACCU-CHEK FASTCLIX LANCETS DR    Pharmacy where request should be sent: [id number] [place] [facility] [address] - [phone]  - [phone]     Last office visit with prescribing clinician: 2023     Next office visit with prescribing clinician: 2023     Additional details provided by patient: HER RX HAS     Does the patient have less than a 3 day supply:  [] Yes  [x] No    Would you like a call back once the refill request has been completed: [x] Yes [] No    If the office needs to give you a call back, can they leave a voicemail: [x] Yes [] No    Kiya Hernandez Rep   23 10:17 EDT

## 2023-09-20 DIAGNOSIS — E11.65 TYPE 2 DIABETES MELLITUS WITH HYPERGLYCEMIA, WITHOUT LONG-TERM CURRENT USE OF INSULIN: Primary | ICD-10-CM

## 2023-09-20 RX ORDER — LANCETS
EACH MISCELLANEOUS
Qty: 102 EACH | Refills: 3 | Status: SHIPPED | OUTPATIENT
Start: 2023-09-20

## 2023-11-27 ENCOUNTER — LAB (OUTPATIENT)
Dept: LAB | Facility: HOSPITAL | Age: 63
End: 2023-11-27
Payer: MEDICARE

## 2023-11-27 DIAGNOSIS — E55.9 VITAMIN D DEFICIENCY: ICD-10-CM

## 2023-11-27 DIAGNOSIS — E03.9 HYPOTHYROIDISM, UNSPECIFIED TYPE: ICD-10-CM

## 2023-11-27 DIAGNOSIS — E11.65 TYPE 2 DIABETES MELLITUS WITH HYPERGLYCEMIA, WITHOUT LONG-TERM CURRENT USE OF INSULIN: ICD-10-CM

## 2023-11-27 DIAGNOSIS — E78.2 MIXED HYPERLIPIDEMIA: ICD-10-CM

## 2023-11-27 LAB
25(OH)D3 SERPL-MCNC: 34.1 NG/ML (ref 30–100)
ALBUMIN SERPL-MCNC: 5 G/DL (ref 3.5–5.2)
ALBUMIN UR-MCNC: <1.2 MG/DL
ALBUMIN/GLOB SERPL: 2 G/DL
ALP SERPL-CCNC: 79 U/L (ref 39–117)
ALT SERPL W P-5'-P-CCNC: 26 U/L (ref 1–33)
ANION GAP SERPL CALCULATED.3IONS-SCNC: 13 MMOL/L (ref 5–15)
AST SERPL-CCNC: 19 U/L (ref 1–32)
BILIRUB SERPL-MCNC: 0.2 MG/DL (ref 0–1.2)
BUN SERPL-MCNC: 15 MG/DL (ref 8–23)
BUN/CREAT SERPL: 22.1 (ref 7–25)
CALCIUM SPEC-SCNC: 10.3 MG/DL (ref 8.6–10.5)
CHLORIDE SERPL-SCNC: 102 MMOL/L (ref 98–107)
CHOLEST SERPL-MCNC: 207 MG/DL (ref 0–200)
CO2 SERPL-SCNC: 23 MMOL/L (ref 22–29)
CREAT SERPL-MCNC: 0.68 MG/DL (ref 0.57–1)
CREAT UR-MCNC: 47.6 MG/DL
EGFRCR SERPLBLD CKD-EPI 2021: 96.8 ML/MIN/1.73
GLOBULIN UR ELPH-MCNC: 2.5 GM/DL
GLUCOSE SERPL-MCNC: 163 MG/DL (ref 65–99)
HBA1C MFR BLD: 7 % (ref 4.8–5.6)
HDLC SERPL-MCNC: 63 MG/DL (ref 40–60)
LDLC SERPL CALC-MCNC: 115 MG/DL (ref 0–100)
LDLC/HDLC SERPL: 1.76 {RATIO}
MICROALBUMIN/CREAT UR: NORMAL MG/G{CREAT}
POTASSIUM SERPL-SCNC: 4.6 MMOL/L (ref 3.5–5.2)
PROT SERPL-MCNC: 7.5 G/DL (ref 6–8.5)
SODIUM SERPL-SCNC: 138 MMOL/L (ref 136–145)
T4 FREE SERPL-MCNC: 1.52 NG/DL (ref 0.93–1.7)
TRIGL SERPL-MCNC: 165 MG/DL (ref 0–150)
TSH SERPL DL<=0.05 MIU/L-ACNC: 0.25 UIU/ML (ref 0.27–4.2)
VLDLC SERPL-MCNC: 29 MG/DL (ref 5–40)

## 2023-11-27 PROCEDURE — 83036 HEMOGLOBIN GLYCOSYLATED A1C: CPT

## 2023-11-27 PROCEDURE — 82570 ASSAY OF URINE CREATININE: CPT

## 2023-11-27 PROCEDURE — 80053 COMPREHEN METABOLIC PANEL: CPT

## 2023-11-27 PROCEDURE — 82043 UR ALBUMIN QUANTITATIVE: CPT

## 2023-11-27 PROCEDURE — 36415 COLL VENOUS BLD VENIPUNCTURE: CPT

## 2023-11-27 PROCEDURE — 84439 ASSAY OF FREE THYROXINE: CPT

## 2023-11-27 PROCEDURE — 82306 VITAMIN D 25 HYDROXY: CPT

## 2023-11-27 PROCEDURE — 80061 LIPID PANEL: CPT

## 2023-11-27 PROCEDURE — 84443 ASSAY THYROID STIM HORMONE: CPT

## 2023-12-04 ENCOUNTER — OFFICE VISIT (OUTPATIENT)
Dept: ENDOCRINOLOGY | Facility: CLINIC | Age: 63
End: 2023-12-04
Payer: MEDICARE

## 2023-12-04 VITALS
WEIGHT: 192 LBS | OXYGEN SATURATION: 96 % | HEIGHT: 62 IN | SYSTOLIC BLOOD PRESSURE: 140 MMHG | BODY MASS INDEX: 35.33 KG/M2 | HEART RATE: 87 BPM | DIASTOLIC BLOOD PRESSURE: 68 MMHG

## 2023-12-04 DIAGNOSIS — E78.2 MIXED HYPERLIPIDEMIA: ICD-10-CM

## 2023-12-04 DIAGNOSIS — E03.9 HYPOTHYROIDISM, UNSPECIFIED TYPE: ICD-10-CM

## 2023-12-04 DIAGNOSIS — E55.9 VITAMIN D DEFICIENCY: ICD-10-CM

## 2023-12-04 DIAGNOSIS — E11.65 TYPE 2 DIABETES MELLITUS WITH HYPERGLYCEMIA, WITHOUT LONG-TERM CURRENT USE OF INSULIN: Primary | ICD-10-CM

## 2023-12-04 LAB — GLUCOSE BLDC GLUCOMTR-MCNC: 155 MG/DL (ref 70–105)

## 2023-12-04 PROCEDURE — 82948 REAGENT STRIP/BLOOD GLUCOSE: CPT | Performed by: INTERNAL MEDICINE

## 2023-12-04 RX ORDER — ARIPIPRAZOLE 10 MG/1
1 TABLET ORAL DAILY
COMMUNITY
Start: 2023-11-30

## 2023-12-04 NOTE — PATIENT INSTRUCTIONS
Keep up the good work!  Continue exercise.  Continue diabetes thyroid & chol meds & multivitamins.  Call if blood sugars are running over 160.  F/u in 6 months, with labs prior.

## 2023-12-19 NOTE — PROGRESS NOTES
[facility]        Patient Care Team:  Nancy Cain MD as PCP - General (Family Medicine)  Felisha Culver MD (Psychiatry)  Addy Vu MD as Consulting Physician (Pulmonary Disease)    Chief Complaint:    Chief Complaint   Patient presents with    Diabetes     FU / DM type 2 /          Subjective   Here for diabetes f/u  Blood sugars: mid 100's  Exercise program: walking  Taking multivitamins    Interval History:     Patient Complaints:  grieving:   2 mo ago  Patient Denies: hypoglycemia  History taken from: patient    Review of Systems:   Review of Systems   Constitutional:  Positive for unexpected weight loss.   HENT:  Negative for trouble swallowing.    Eyes:  Negative for blurred vision.   Cardiovascular:  Negative for palpitations.   Gastrointestinal:  Negative for nausea.   Endocrine: Negative for polyuria.   Neurological:  Negative for tremors and headache.   Psychiatric/Behavioral:  Positive for depressed mood.    Lost  12 lb since last visit    Objective     Vital Signs     Vitals:    23 0949   BP: 140/68   Pulse: 87   SpO2: 96%         Physical Exam:     General Appearance:    Alert, cooperative, in no acute distress. Obese   Head:    Normocephalic, without obvious abnormality, atraumatic   Eyes:            Lids and lashes normal, conjunctivae and sclerae normal, no   icterus, no pallor, corneas clear, PERRLA   Throat:   No oral lesions,  oral mucosa moist   Neck:   No adenopathy, supple,  no thyromegaly, no carotid bruit   Lungs:     Clear    Heart:    Regular rhythm and normal rate   Chest Wall:    No abnormalities observed   Abdomen:     Normal bowel sounds, soft                 Extremities:   Moves all extremities well, no edema               Pulses:   Pulses palpable and equal bilaterally   Skin:   Dry. Plantar calluses   Neurologic:  DTR absent in ankles, vibratory sense 25% decreased in toes, able to feel the 10g monofilament ( same  as 1y ago )            Results Review:    I have reviewed the patient's new clinical results, labs & imaging.    Medication Review:   Prior to Admission medications    Medication Sig Start Date End Date Taking? Authorizing Provider   Accu-Chek FastClix Lancets [facility] Pt to use to check her blood sugars 9/20/23  Yes Jovita Conde MD   ARIPiprazole (ABILIFY) 10 MG tablet Take 1 tablet by mouth Daily. 11/30/23  Yes Ivana Marrero MD   Biotin 51535 MCG tablet dispersible Place  on the tongue.   Yes Ivana Marrero MD   Blood Glucose Monitoring Suppl (ACCU-CHEK GUIDE ME) w/Device kit 1 each by Other route Take As Directed.   2/5/20  Yes Ivana Marrero MD   busPIRone (BUSPAR) 15 MG tablet Take 1 tablet by mouth 4 (Four) Times a Day. prn 7/30/19  Yes Ivana Marrero MD   carBAMazepine (TEGretol) 200 MG tablet 1 tablet 4 (Four) Times a Day. 7/30/19  Yes Ivana Marrero MD   Cholecalciferol (VITAMIN D3) 5000 units capsule capsule Take 1 capsule by mouth Daily.   Yes Ivana Marrero MD   Dulaglutide (Trulicity) 1.5 MG/0.5ML solution pen-injector Inject 1.5 mg under the skin into the appropriate area as directed Every 7 (Seven) Days. 5/8/23  Yes Jovita Conde MD   fenofibrate 160 MG tablet TAKE 1 TABLET BY MOUTH EVERYDAY AT BEDTIME 6/15/23  Yes Jovita Conde MD   gabapentin (NEURONTIN) 600 MG tablet Take 1 tablet by mouth 2 (Two) Times a Day. 2 tabs tid 6/9/19  Yes Ivana Marrero MD   glucose blood (Accu-Chek Guide) test strip TEST BLOOD SUGAR TWICE A DAY. DX: E11.9 9/11/23  Yes Jovita Conde MD   Jardiance 25 MG tablet tablet TAKE 1 TABLET BY MOUTH EVERY DAY BEFORE BREAKFAST 1/23/23  Yes Jovita Conde MD   levothyroxine (SYNTHROID, LEVOTHROID) 200 MCG tablet TAKE 1 TABLET BY MOUTH EVERY DAY 1/23/23  Yes Jovita Conde MD   melatonin 1 MG tablet Take  by mouth Daily.   Yes Ivana Marrero MD   metFORMIN ER (GLUCOPHAGE-XR) 750 MG 24 hr tablet TAKE 1  TABLET BY MOUTH EVERY DAY WITH BREAKFAST 6/6/23  Yes Jovita Conde MD   MULTIPLE VITAMIN PO MULTIVITAMINS TABS 12/9/16  Yes Provider, MD Ivana   naproxen sodium (ALEVE) 220 MG tablet ALEVE 220 MG TABS 12/9/16  Yes Provider, Historical, MD   Vraylar 3 MG capsule capsule Take 1 capsule by mouth Daily. 1.5 mg 3/23/23  Yes Provider, MD Ivana       Lab Results (most recent)       Procedure Component Value Units Date/Time    POC Glucose [[id number]]  (Abnormal) Collected: 12/04/23 0955    Specimen: Blood Updated: 12/04/23 0956     Glucose 155 mg/dL      Comment: Serial Number: [id number]:  [id number]             Lab Results   Component Value Date    HGBA1C 7.00 (H) 11/27/2023    HGBA1C 11.10 (H) 04/24/2023    HGBA1C 7.4 (H) 10/10/2022      Lab Results   Component Value Date    GLUCOSE 163 (H) 11/27/2023    BUN 15 11/27/2023    CREATININE 0.68 11/27/2023    EGFRIFNONA 72 11/01/2021    BCR 22.1 11/27/2023    K 4.6 11/27/2023    CO2 23.0 11/27/2023    CALCIUM 10.3 11/27/2023    ALBUMIN 5.0 11/27/2023    LABIL2 1.4 03/27/2019    AST 19 11/27/2023    ALT 26 11/27/2023    CHOL 207 (H) 11/27/2023     (H) 11/27/2023    HDL 63 (H) 11/27/2023    TRIG 165 (H) 11/27/2023     Lab Results   Component Value Date    TSH 0.252 (L) 11/27/2023    FREET4 1.52 11/27/2023    [id number] 34.1 11/27/2023     Microalb/cr ratio <1    Assessment & Plan     Diagnoses and all orders for this visit:    1. Type 2 diabetes mellitus with hyperglycemia, without long-term current use of insulin (Primary)  -     Hemoglobin A1c; Future    2. Vitamin D deficiency    3. Mixed hyperlipidemia  -     Comprehensive Metabolic Panel; Future    4. Hypothyroidism, unspecified type    Other orders  -     POC Glucose    Glucose control & lipids improved. Thyroid & vit D stable.    Continue exercise.  Continue diabetes thyroid & chol meds & multivitamins.  Call if blood sugars are running over 160.        Jovita Conde MD  12/18/23  23:28  EST

## 2023-12-26 DIAGNOSIS — E03.9 HYPOTHYROIDISM, UNSPECIFIED TYPE: ICD-10-CM

## 2023-12-27 RX ORDER — LEVOTHYROXINE SODIUM 0.2 MG/1
TABLET ORAL
Qty: 90 TABLET | Refills: 2 | Status: SHIPPED | OUTPATIENT
Start: 2023-12-27

## 2024-01-08 DIAGNOSIS — E11.65 TYPE 2 DIABETES MELLITUS WITH HYPERGLYCEMIA, WITHOUT LONG-TERM CURRENT USE OF INSULIN: ICD-10-CM

## 2024-01-08 RX ORDER — EMPAGLIFLOZIN 25 MG/1
TABLET, FILM COATED ORAL
Qty: 90 TABLET | Refills: 2 | Status: SHIPPED | OUTPATIENT
Start: 2024-01-08

## 2024-01-18 RX ORDER — DULAGLUTIDE 0.75 MG/.5ML
0.75 INJECTION, SOLUTION SUBCUTANEOUS WEEKLY
Qty: 2 ML | Refills: 6 | Status: SHIPPED | OUTPATIENT
Start: 2024-01-18

## 2024-02-23 ENCOUNTER — TELEPHONE (OUTPATIENT)
Dept: ENDOCRINOLOGY | Facility: CLINIC | Age: 64
End: 2024-02-23
Payer: MEDICARE

## 2024-02-23 DIAGNOSIS — E11.65 TYPE 2 DIABETES MELLITUS WITH HYPERGLYCEMIA, WITHOUT LONG-TERM CURRENT USE OF INSULIN: Primary | ICD-10-CM

## 2024-02-23 RX ORDER — SEMAGLUTIDE 2.68 MG/ML
2 INJECTION, SOLUTION SUBCUTANEOUS WEEKLY
Qty: 6 ML | Refills: 0 | Status: CANCELLED | OUTPATIENT
Start: 2024-02-23

## 2024-02-23 RX ORDER — SEMAGLUTIDE 1.34 MG/ML
1 INJECTION, SOLUTION SUBCUTANEOUS WEEKLY
Qty: 9 ML | Refills: 3 | Status: SHIPPED | OUTPATIENT
Start: 2024-02-23

## 2024-02-23 NOTE — TELEPHONE ENCOUNTER
Pt called stating she could not get her Trulicity due to shortage. Informed pt to reach out to her insurance and see if they will cover either the Ozempic or Mounjaro.        Pt called back stating 90 day supply of 2mg Ozempic is covered for $84 and would like it sent to [facility] [place]. Rx pending provider signature.

## 2024-02-23 NOTE — TELEPHONE ENCOUNTER
Talked to pt.  Ozempic 2 mg is too strong for her.  Will send 1 mg weekly.  She pharm doesn't have Ozempic either,  she will come to the office for sample of Ozempic pen & will take 05 mg weekly until she can get 1 mg Rx from pharm.

## 2024-02-23 NOTE — TELEPHONE ENCOUNTER
Pt called  stating she could not get her Trulicity due to shortage. Informed pt to reach out to her insurance and see if they will cover either the Ozempic or Mounjaro. Pt is to call back.

## 2024-03-04 DIAGNOSIS — E11.65 TYPE 2 DIABETES MELLITUS WITH HYPERGLYCEMIA, WITHOUT LONG-TERM CURRENT USE OF INSULIN: ICD-10-CM

## 2024-03-05 RX ORDER — METFORMIN HYDROCHLORIDE 750 MG/1
TABLET, EXTENDED RELEASE ORAL
Qty: 90 TABLET | Refills: 3 | Status: SHIPPED | OUTPATIENT
Start: 2024-03-05

## 2024-04-17 ENCOUNTER — TELEPHONE (OUTPATIENT)
Dept: ENDOCRINOLOGY | Facility: CLINIC | Age: 64
End: 2024-04-17
Payer: MEDICARE

## 2024-04-17 NOTE — TELEPHONE ENCOUNTER
Caller: Mary Grace Walsh    Relationship: Self    Best call back number: [phone]    Requested Prescriptions:     Jardiance 25 MG tablet tablet     Requested Prescriptions      No prescriptions requested or ordered in this encounter        Pharmacy where request should be sent: [facility]- [address]     Last office visit with prescribing clinician: 12/4/2023   Last telemedicine visit with prescribing clinician: Visit date not found   Next office visit with prescribing clinician: 7/15/2024     Additional details provided by patient: PT STATED THAT THE JARDIANCE COST $500 OR MORE. PT NEEDS AN ALTERNATE MEDICATION THAT WOULD BE CHEAPER. PT HAS A WEEK LEFT AND WILL NEED A REFILL ON IT.     Does the patient have less than a 3 day supply:  [] Yes  [x] No    Would you like a call back once the refill request has been completed: [x] Yes [] No    If the office needs to give you a call back, can they leave a voicemail: [x] Yes [] No    Kiya Read Rep   04/17/24 15:33 EDT

## 2024-05-01 NOTE — TELEPHONE ENCOUNTER
Called & left msg for pt. Sorry for the delay, but just received the msg from 4/17.  If she hasn't done it yet, please contact insurance to see if another one of the meds like Jardiance:  Farxiga, Invokana or Steglatro. Check if better coverage & let us know so we can send Rx.

## 2024-05-01 NOTE — TELEPHONE ENCOUNTER
Spoke with pt and gave her some names of medications that might be cheaper. Pt is calling her insurance and will let us know.

## 2024-06-12 DIAGNOSIS — E78.5 HYPERLIPIDEMIA, UNSPECIFIED HYPERLIPIDEMIA TYPE: ICD-10-CM

## 2024-06-12 RX ORDER — FENOFIBRATE 160 MG/1
TABLET ORAL
Qty: 90 TABLET | Refills: 3 | Status: SHIPPED | OUTPATIENT
Start: 2024-06-12

## 2024-07-08 ENCOUNTER — LAB (OUTPATIENT)
Dept: LAB | Facility: HOSPITAL | Age: 64
End: 2024-07-08
Payer: MEDICARE

## 2024-07-08 DIAGNOSIS — E11.65 TYPE 2 DIABETES MELLITUS WITH HYPERGLYCEMIA, WITHOUT LONG-TERM CURRENT USE OF INSULIN: ICD-10-CM

## 2024-07-08 DIAGNOSIS — E78.2 MIXED HYPERLIPIDEMIA: ICD-10-CM

## 2024-07-08 LAB
ALBUMIN SERPL-MCNC: 4.7 G/DL (ref 3.5–5.2)
ALBUMIN/GLOB SERPL: 1.7 G/DL
ALP SERPL-CCNC: 72 U/L (ref 39–117)
ALT SERPL W P-5'-P-CCNC: 28 U/L (ref 1–33)
ANION GAP SERPL CALCULATED.3IONS-SCNC: 14 MMOL/L (ref 5–15)
AST SERPL-CCNC: 19 U/L (ref 1–32)
BILIRUB SERPL-MCNC: 0.2 MG/DL (ref 0–1.2)
BUN SERPL-MCNC: 16 MG/DL (ref 8–23)
BUN/CREAT SERPL: 23.5 (ref 7–25)
CALCIUM SPEC-SCNC: 9.8 MG/DL (ref 8.6–10.5)
CHLORIDE SERPL-SCNC: 104 MMOL/L (ref 98–107)
CO2 SERPL-SCNC: 23 MMOL/L (ref 22–29)
CREAT SERPL-MCNC: 0.68 MG/DL (ref 0.57–1)
EGFRCR SERPLBLD CKD-EPI 2021: 96.8 ML/MIN/1.73
GLOBULIN UR ELPH-MCNC: 2.8 GM/DL
GLUCOSE SERPL-MCNC: 157 MG/DL (ref 65–99)
HBA1C MFR BLD: 6.73 % (ref 4.8–5.6)
POTASSIUM SERPL-SCNC: 4.1 MMOL/L (ref 3.5–5.2)
PROT SERPL-MCNC: 7.5 G/DL (ref 6–8.5)
SODIUM SERPL-SCNC: 141 MMOL/L (ref 136–145)

## 2024-07-08 PROCEDURE — 80053 COMPREHEN METABOLIC PANEL: CPT

## 2024-07-08 PROCEDURE — 83036 HEMOGLOBIN GLYCOSYLATED A1C: CPT

## 2024-07-08 PROCEDURE — 36415 COLL VENOUS BLD VENIPUNCTURE: CPT

## 2024-07-15 ENCOUNTER — OFFICE VISIT (OUTPATIENT)
Dept: ENDOCRINOLOGY | Facility: CLINIC | Age: 64
End: 2024-07-15
Payer: MEDICARE

## 2024-07-15 VITALS
SYSTOLIC BLOOD PRESSURE: 122 MMHG | HEIGHT: 62 IN | WEIGHT: 190 LBS | OXYGEN SATURATION: 94 % | HEART RATE: 96 BPM | BODY MASS INDEX: 34.96 KG/M2 | DIASTOLIC BLOOD PRESSURE: 74 MMHG

## 2024-07-15 DIAGNOSIS — E55.9 VITAMIN D DEFICIENCY: ICD-10-CM

## 2024-07-15 DIAGNOSIS — E78.2 MIXED HYPERLIPIDEMIA: ICD-10-CM

## 2024-07-15 DIAGNOSIS — E03.9 HYPOTHYROIDISM, UNSPECIFIED TYPE: ICD-10-CM

## 2024-07-15 DIAGNOSIS — E11.65 TYPE 2 DIABETES MELLITUS WITH HYPERGLYCEMIA, WITHOUT LONG-TERM CURRENT USE OF INSULIN: Primary | ICD-10-CM

## 2024-07-15 LAB — GLUCOSE BLDC GLUCOMTR-MCNC: 124 MG/DL (ref 70–105)

## 2024-07-15 PROCEDURE — 82948 REAGENT STRIP/BLOOD GLUCOSE: CPT | Performed by: INTERNAL MEDICINE

## 2024-07-15 NOTE — PATIENT INSTRUCTIONS
Keep up the good work!  See eye doctor in Aug.  Continue exercise.  Continue diabetes, thyroid & lipid meds & multivitamins.  Call if blood sugars are running under 100 or over 200.  F/u in 6 months, with fasting labs prior.

## 2024-07-21 NOTE — PROGRESS NOTES
[facility]        Patient Care Team:  Nancy Cain MD as PCP - General (Family Medicine)  Felisha Culver MD (Psychiatry)  Addy Vu MD as Consulting Physician (Pulmonary Disease)    Chief Complaint:    Chief Complaint   Patient presents with   • Diabetes     Fu / DM type 2 /          Subjective   Here for diabetes f/u  Blood sugars: mid 100's  Exercise program: walking  Taking multivitamins    Interval History:     Patient Complaints:    in Oct  Patient Denies: swelling  History taken from: patient    Review of Systems:   Review of Systems   Eyes:  Negative for blurred vision.   Gastrointestinal:  Negative for nausea.   Endocrine: Negative for polyuria.   Neurological:  Negative for headache.   Psychiatric/Behavioral:  Positive for stress.    Lost  2 lb since last visit    Objective     Vital Signs     Vitals:    07/15/24 1239   BP: 122/74   Pulse: 96   SpO2: 94%         Physical Exam:     General Appearance:    Alert, cooperative, in no acute distress. Obese   Head:    Normocephalic, without obvious abnormality, atraumatic   Eyes:            Lids and lashes normal, conjunctivae and sclerae normal, no   icterus, no pallor, corneas clear, PERRLA   Throat:   No oral lesions,  oral mucosa moist   Neck:   No adenopathy, supple,  no thyromegaly, no carotid bruit   Lungs:     Clear    Heart:    Regular rhythm and normal rate   Chest Wall:    No abnormalities observed   Abdomen:     Normal bowel sounds, soft                 Extremities:   Moves all extremities well, no edema               Pulses:   Pulses palpable and equal bilaterally   Skin:   Dry. Plantar calluses   Neurologic:  DTR absent, able to feel the 10g monofilament          Results Review:    I have reviewed the patient's new clinical results, labs & imaging.    Medication Review:   Prior to Admission medications    Medication Sig Start Date End Date Taking? Authorizing Provider   Accu-Avrilk  FastClix Lancets misc Pt to use to check her blood sugars 9/20/23  Yes Jovita Conde MD   ARIPiprazole (ABILIFY) 10 MG tablet Take 1 tablet by mouth Daily. 11/30/23  Yes Ivana Marrero MD   Biotin 07969 MCG tablet dispersible Place  on the tongue.   Yes Ivana Marrero MD   Blood Glucose Monitoring Suppl (ACCU-CHEK GUIDE ME) w/Device kit 1 each by Other route Take As Directed.   2/5/20  Yes Ivana Marrero MD   busPIRone (BUSPAR) 15 MG tablet Take 1 tablet by mouth 4 (Four) Times a Day. prn 7/30/19  Yes Ivana Marrero MD   carBAMazepine (TEGretol) 200 MG tablet 1 tablet 4 (Four) Times a Day. 7/30/19  Yes Ivana Marrero MD   Cholecalciferol (VITAMIN D3) 5000 units capsule capsule Take 1 capsule by mouth Daily.   Yes Ivana Marrero MD   fenofibrate 160 MG tablet TAKE 1 TABLET BY MOUTH EVERYDAY AT BEDTIME 6/12/24  Yes Jovita Conde MD   gabapentin (NEURONTIN) 600 MG tablet Take 1 tablet by mouth 2 (Two) Times a Day. 2 tabs tid 6/9/19  Yes Ivana Marrero MD   glucose blood (Accu-Chek Guide) test strip TEST BLOOD SUGAR TWICE A DAY. DX: E11.9 9/11/23  Yes Jovita Conde MD   Jardiance 25 MG tablet tablet TAKE 1 TABLET BY MOUTH EVERY DAY BEFORE BREAKFAST 1/8/24  Yes Jovita Conde MD   levothyroxine (SYNTHROID, LEVOTHROID) 200 MCG tablet TAKE 1 TABLET BY MOUTH EVERY DAY 12/27/23  Yes Jovita Conde MD   melatonin 1 MG tablet Take  by mouth Daily.   Yes Ivana Marrero MD   metFORMIN ER (GLUCOPHAGE-XR) 750 MG 24 hr tablet TAKE 1 TABLET BY MOUTH EVERY DAY WITH BREAKFAST 3/5/24  Yes Jovita Conde MD   metroNIDAZOLE (METROCREAM) 0.75 % cream APPLY A THIN LAYER TO AFFECTED AREAS OF REDNESS ON FACE 1-2 TIMES DAILY. 6/20/24  Yes Ivana Marrero MD   MULTIPLE VITAMIN PO MULTIVITAMINS TABS 12/9/16  Yes Ivana Marrero MD   naproxen sodium (ALEVE) 220 MG tablet ALEVE 220 MG TABS 12/9/16  Yes Ivana Marrero MD   Semaglutide, 1 MG/DOSE,  (Ozempic, 1 MG/DOSE,) 4 MG/3ML solution pen-injector Inject 1 mg under the skin into the appropriate area as directed 1 (One) Time Per Week. 2/23/24  Yes Jovita Conde MD       Lab Results (most recent)       Procedure Component Value Units Date/Time    POC Glucose [[id number]]  (Abnormal) Collected: 07/15/24 1243    Specimen: Blood Updated: 07/15/24 1245     Glucose 124 mg/dL      Comment: Serial Number: [id number]:  [id number]          Lab Results   Component Value Date    HGBA1C 6.73 (H) 07/08/2024    HGBA1C 7.00 (H) 11/27/2023    HGBA1C 11.10 (H) 04/24/2023      Lab Results   Component Value Date    GLUCOSE 157 (H) 07/08/2024    BUN 16 07/08/2024    CREATININE 0.68 07/08/2024    EGFRIFNONA 72 11/01/2021    BCR 23.5 07/08/2024    K 4.1 07/08/2024    CO2 23.0 07/08/2024    CALCIUM 9.8 07/08/2024    ALBUMIN 4.7 07/08/2024    LABIL2 1.4 03/27/2019    AST 19 07/08/2024    ALT 28 07/08/2024    CHOL 207 (H) 11/27/2023     (H) 11/27/2023    HDL 63 (H) 11/27/2023    TRIG 165 (H) 11/27/2023     Lab Results   Component Value Date    TSH 0.252 (L) 11/27/2023    FREET4 1.52 11/27/2023    [id number] 34.1 11/27/2023       Assessment & Plan     Diagnoses and all orders for this visit:    1. Type 2 diabetes mellitus with hyperglycemia, without long-term current use of insulin (Primary)  -     Hemoglobin A1c; Future  -     Microalbumin / Creatinine Urine Ratio - Urine, Clean Catch; Future  -     Lipid Panel; Future    2. Vitamin D deficiency  -     Vitamin D,25-Hydroxy; Future    3. Hypothyroidism, unspecified type  -     TSH; Future  -     T4, Free; Future    4. Mixed hyperlipidemia  -     Comprehensive Metabolic Panel; Future  -     Lipid Panel; Future    Other orders  -     POC Glucose    Glucose control improved. Will check lipid, thyroid & vit D status next visit.    See eye doctor in Aug.  Continue exercise.  Continue diabetes, thyroid & lipid meds & multivitamins.  Call if blood sugars are running under 100  or over 200.        Jovita Conde MD  07/20/24  20:22 EDT

## 2024-09-18 DIAGNOSIS — E03.9 HYPOTHYROIDISM, UNSPECIFIED TYPE: ICD-10-CM

## 2024-09-18 RX ORDER — LEVOTHYROXINE SODIUM 200 UG/1
TABLET ORAL
Qty: 90 TABLET | Refills: 2 | Status: SHIPPED | OUTPATIENT
Start: 2024-09-18

## 2024-10-19 DIAGNOSIS — E11.65 TYPE 2 DIABETES MELLITUS WITH HYPERGLYCEMIA, WITHOUT LONG-TERM CURRENT USE OF INSULIN: ICD-10-CM

## 2024-10-21 RX ORDER — EMPAGLIFLOZIN 25 MG/1
TABLET, FILM COATED ORAL
Qty: 90 TABLET | Refills: 2 | Status: SHIPPED | OUTPATIENT
Start: 2024-10-21

## 2025-01-09 ENCOUNTER — LAB (OUTPATIENT)
Dept: LAB | Facility: HOSPITAL | Age: 65
End: 2025-01-09
Payer: MEDICARE

## 2025-01-09 DIAGNOSIS — E55.9 VITAMIN D DEFICIENCY: ICD-10-CM

## 2025-01-09 DIAGNOSIS — E03.9 HYPOTHYROIDISM, UNSPECIFIED TYPE: ICD-10-CM

## 2025-01-09 DIAGNOSIS — E11.65 TYPE 2 DIABETES MELLITUS WITH HYPERGLYCEMIA, WITHOUT LONG-TERM CURRENT USE OF INSULIN: ICD-10-CM

## 2025-01-09 DIAGNOSIS — E78.2 MIXED HYPERLIPIDEMIA: ICD-10-CM

## 2025-01-09 LAB
25(OH)D3 SERPL-MCNC: 35.5 NG/ML (ref 30–100)
ALBUMIN SERPL-MCNC: 4.5 G/DL (ref 3.5–5.2)
ALBUMIN UR-MCNC: 3.9 MG/DL
ALBUMIN/GLOB SERPL: 1.4 G/DL
ALP SERPL-CCNC: 81 U/L (ref 39–117)
ALT SERPL W P-5'-P-CCNC: 26 U/L (ref 1–33)
ANION GAP SERPL CALCULATED.3IONS-SCNC: 14 MMOL/L (ref 5–15)
AST SERPL-CCNC: 21 U/L (ref 1–32)
BILIRUB SERPL-MCNC: 0.4 MG/DL (ref 0–1.2)
BUN SERPL-MCNC: 18 MG/DL (ref 8–23)
BUN/CREAT SERPL: 27.3 (ref 7–25)
CALCIUM SPEC-SCNC: 10 MG/DL (ref 8.6–10.5)
CHLORIDE SERPL-SCNC: 103 MMOL/L (ref 98–107)
CHOLEST SERPL-MCNC: 282 MG/DL (ref 0–200)
CO2 SERPL-SCNC: 23 MMOL/L (ref 22–29)
CREAT SERPL-MCNC: 0.66 MG/DL (ref 0.57–1)
CREAT UR-MCNC: 66.2 MG/DL
EGFRCR SERPLBLD CKD-EPI 2021: 96.9 ML/MIN/1.73
GLOBULIN UR ELPH-MCNC: 3.2 GM/DL
GLUCOSE SERPL-MCNC: 179 MG/DL (ref 65–99)
HBA1C MFR BLD: 8.71 % (ref 4.8–5.6)
HDLC SERPL-MCNC: 40 MG/DL (ref 40–60)
LDLC SERPL CALC-MCNC: 132 MG/DL (ref 0–100)
LDLC/HDLC SERPL: 3.09 {RATIO}
MICROALBUMIN/CREAT UR: 58.9 MG/G (ref 0–29)
POTASSIUM SERPL-SCNC: 4.2 MMOL/L (ref 3.5–5.2)
PROT SERPL-MCNC: 7.7 G/DL (ref 6–8.5)
SODIUM SERPL-SCNC: 140 MMOL/L (ref 136–145)
T4 FREE SERPL-MCNC: 1.62 NG/DL (ref 0.92–1.68)
TRIGL SERPL-MCNC: 592 MG/DL (ref 0–150)
TSH SERPL DL<=0.05 MIU/L-ACNC: 0.06 UIU/ML (ref 0.27–4.2)
VLDLC SERPL-MCNC: 110 MG/DL (ref 5–40)

## 2025-01-09 PROCEDURE — 82306 VITAMIN D 25 HYDROXY: CPT

## 2025-01-09 PROCEDURE — 83036 HEMOGLOBIN GLYCOSYLATED A1C: CPT

## 2025-01-09 PROCEDURE — 80061 LIPID PANEL: CPT

## 2025-01-09 PROCEDURE — 82043 UR ALBUMIN QUANTITATIVE: CPT

## 2025-01-09 PROCEDURE — 36415 COLL VENOUS BLD VENIPUNCTURE: CPT

## 2025-01-09 PROCEDURE — 84443 ASSAY THYROID STIM HORMONE: CPT

## 2025-01-09 PROCEDURE — 80053 COMPREHEN METABOLIC PANEL: CPT

## 2025-01-09 PROCEDURE — 84439 ASSAY OF FREE THYROXINE: CPT

## 2025-01-09 PROCEDURE — 82570 ASSAY OF URINE CREATININE: CPT

## 2025-01-14 ENCOUNTER — OFFICE VISIT (OUTPATIENT)
Dept: ENDOCRINOLOGY | Facility: CLINIC | Age: 65
End: 2025-01-14
Payer: MEDICARE

## 2025-01-14 VITALS
BODY MASS INDEX: 33.86 KG/M2 | WEIGHT: 184 LBS | HEIGHT: 62 IN | DIASTOLIC BLOOD PRESSURE: 70 MMHG | HEART RATE: 102 BPM | OXYGEN SATURATION: 95 % | SYSTOLIC BLOOD PRESSURE: 124 MMHG

## 2025-01-14 DIAGNOSIS — E78.2 MIXED HYPERLIPIDEMIA: ICD-10-CM

## 2025-01-14 DIAGNOSIS — E11.65 TYPE 2 DIABETES MELLITUS WITH HYPERGLYCEMIA, WITHOUT LONG-TERM CURRENT USE OF INSULIN: Primary | ICD-10-CM

## 2025-01-14 DIAGNOSIS — E55.9 VITAMIN D DEFICIENCY: ICD-10-CM

## 2025-01-14 DIAGNOSIS — E03.9 HYPOTHYROIDISM, UNSPECIFIED TYPE: ICD-10-CM

## 2025-01-14 LAB — GLUCOSE BLDC GLUCOMTR-MCNC: 180 MG/DL (ref 70–105)

## 2025-01-14 PROCEDURE — 99214 OFFICE O/P EST MOD 30 MIN: CPT | Performed by: INTERNAL MEDICINE

## 2025-01-14 PROCEDURE — 82948 REAGENT STRIP/BLOOD GLUCOSE: CPT | Performed by: INTERNAL MEDICINE

## 2025-01-14 PROCEDURE — 3052F HG A1C>EQUAL 8.0%<EQUAL 9.0%: CPT | Performed by: INTERNAL MEDICINE

## 2025-01-14 RX ORDER — SEMAGLUTIDE 2.68 MG/ML
2 INJECTION, SOLUTION SUBCUTANEOUS WEEKLY
Qty: 9 ML | Refills: 3 | Status: SHIPPED | OUTPATIENT
Start: 2025-01-14

## 2025-01-14 NOTE — PATIENT INSTRUCTIONS
Try peddling. Do stretching 2x/d.  See eye doctor.  Finish Ozempic 1 mg. Then take 2 mg wkly.  Continue other diabetes, thyroid & lipid meds & vit D supplements.  Call if blood sugars are running over 180.  F/u in 6 months, with fasting labs prior.

## 2025-01-22 NOTE — PROGRESS NOTES
[facility]        Patient Care Team:  Nancy Cain MD as PCP - General (Family Medicine)  Felisha Culver MD (Psychiatry)  Addy Vu MD as Consulting Physician (Pulmonary Disease)    Chief Complaint:    Chief Complaint   Patient presents with    Diabetes     FU / DM type 2 /          Subjective   Here for diabetes f/u  Blood sugars: low 200's  Exercise program: walking around the house  Taking vit D  Taking levothyroxine daily    Interval History:     Patient Complaints:  tolerating Ozempic 1mg/wk  Patient Denies: nausea  History taken from: patient    Review of Systems:   Review of Systems   Eyes:  Negative for blurred vision.   Gastrointestinal:  Negative for nausea.   Endocrine: Negative for polyuria.   Neurological:  Negative for headache.   Lost  6 lb since last visit    Objective     Vital Signs     Vitals:    01/14/25 1407   BP: 124/70   Pulse: 102   SpO2: 95%         Physical Exam:     General Appearance:    Alert, cooperative, in no acute distress. Obese   Head:    Normocephalic, without obvious abnormality, atraumatic   Eyes:            Lids and lashes normal, conjunctivae and sclerae normal, no   icterus, no pallor, corneas clear, PERRLA   Throat:   No oral lesions,  oral mucosa moist   Neck:   No adenopathy, supple,  no thyromegaly, no carotid bruit   Lungs:     Clear    Heart:    Regular rhythm and normal rate   Chest Wall:    No abnormalities observed   Abdomen:     Normal bowel sounds, soft                 Extremities:   Moves all extremities well, no edema               Pulses:   Pulses palpable and equal bilaterally   Skin:   Dry. Plantar calluses   Neurologic:  DTR absent in ankles, vibratory sense 25% decreased in toes, able to feel the 10g monofilament ( same as 1y ago )            Results Review:    I have reviewed the patient's new clinical results, labs & imaging.    Medication Review:   Prior to Admission medications    Medication Sig Start  Date End Date Taking? Authorizing Provider   Accu-Chek FastClix Lancets [facility] [place] to check her blood sugars 9/20/23  Yes Jovita Conde MD   ARIPiprazole (ABILIFY) 10 MG tablet Take 1 tablet by mouth Daily. 11/30/23  Yes Ivana Marrero MD   Biotin 88373 MCG tablet dispersible Place  on the tongue.   Yes Ivana Marrero MD   Blood Glucose Monitoring Suppl (ACCU-CHEK GUIDE ME) w/Device kit 1 each by Other route Take As Directed.   2/5/20  Yes Ivana Marrero MD   busPIRone (BUSPAR) 15 MG tablet Take 1 tablet by mouth 4 (Four) Times a Day. prn 7/30/19  Yes Ivana Marrero MD   carBAMazepine (TEGretol) 200 MG tablet 1 tablet 4 (Four) Times a Day. 7/30/19  Yes Ivana Marrero MD   Cholecalciferol (VITAMIN D3) 5000 units capsule capsule Take 1 capsule by mouth Daily.   Yes Ivana Marrero MD   fenofibrate 160 MG tablet TAKE 1 TABLET BY MOUTH EVERYDAY AT BEDTIME 6/12/24  Yes Jovita Conde MD   gabapentin (NEURONTIN) 600 MG tablet Take 1 tablet by mouth 2 (Two) Times a Day. 2 tabs tid 6/9/19  Yes Ivana Marrero MD   glucose blood (Accu-Chek Guide) test strip TEST BLOOD SUGAR TWICE A DAY. DX: E11.9 9/11/23  Yes Jovita Conde MD   Jardiance 25 MG tablet tablet TAKE 1 TABLET BY MOUTH EVERY DAY BEFORE BREAKFAST 10/21/24  Yes Jovita Conde MD   levothyroxine (SYNTHROID, LEVOTHROID) 200 MCG tablet TAKE 1 TABLET BY MOUTH EVERY DAY 9/18/24  Yes Jovita Conde MD   melatonin 1 MG tablet Take  by mouth Daily.   Yes Ivana Marrero MD   metFORMIN ER (GLUCOPHAGE-XR) 750 MG 24 hr tablet TAKE 1 TABLET BY MOUTH EVERY DAY WITH BREAKFAST 3/5/24  Yes Jovita Conde MD   metroNIDAZOLE (METROCREAM) 0.75 % cream APPLY A THIN LAYER TO AFFECTED AREAS OF REDNESS ON FACE 1-2 TIMES DAILY. 6/20/24  Yes Ivana Marrero MD   MULTIPLE VITAMIN PO MULTIVITAMINS TABS 12/9/16  Yes Ivana Marrero MD   naproxen sodium (ALEVE) 220 MG tablet ALEVE 220 MG TABS 12/9/16   Yes Provider, MD Ivana   Semaglutide, 2 MG/DOSE, (Ozempic, 2 MG/DOSE,) 8 MG/3ML solution pen-injector Inject 2 mg under the skin into the appropriate area as directed 1 (One) Time Per Week. 1/14/25   Jovita Conde MD       Lab Results (most recent)       Procedure Component Value Units Date/Time    POC Glucose [[id number]]  (Abnormal) Collected: 01/14/25 1411    Specimen: Blood Updated: 01/14/25 1412     Glucose 180 mg/dL      Comment: Serial Number: [id number]:  [id number]          Lab Results   Component Value Date    HGBA1C 8.71 (H) 01/09/2025    HGBA1C 6.73 (H) 07/08/2024    HGBA1C 7.00 (H) 11/27/2023      Lab Results   Component Value Date    GLUCOSE 179 (H) 01/09/2025    BUN 18 01/09/2025    CREATININE 0.66 01/09/2025    EGFRIFNONA 72 11/01/2021    BCR 27.3 (H) 01/09/2025    K 4.2 01/09/2025    CO2 23.0 01/09/2025    CALCIUM 10.0 01/09/2025    ALBUMIN 4.5 01/09/2025    LABIL2 1.4 03/27/2019    AST 21 01/09/2025    ALT 26 01/09/2025    CHOL 282 (H) 01/09/2025     (H) 01/09/2025    HDL 40 01/09/2025    TRIG 592 (H) 01/09/2025     Lab Results   Component Value Date    TSH 0.063 (L) 01/09/2025    FREET4 1.62 01/09/2025    RQPP96PC 35.5 01/09/2025     Microalb/cr ratio 58.9    Assessment & Plan     Diagnoses and all orders for this visit:    1. Type 2 diabetes mellitus with hyperglycemia, without long-term current use of insulin (Primary)  -     Semaglutide, 2 MG/DOSE, (Ozempic, 2 MG/DOSE,) 8 MG/3ML solution pen-injector; Inject 2 mg under the skin into the appropriate area as directed 1 (One) Time Per Week.  Dispense: 9 mL; Refill: 3  -     Cancel: Lipid Panel; Future  -     Cancel: Hemoglobin A1c; Future  -     Hemoglobin A1c; Future  -     Lipid Panel; Future    2. Vitamin D deficiency    3. Hypothyroidism, unspecified type  -     Cancel: T4, Free; Future  -     Cancel: TSH; Future  -     TSH; Future  -     T4, Free; Future    4. Mixed hyperlipidemia  -     Cancel: Lipid Panel; Future  -      Cancel: Comprehensive Metabolic Panel; Future  -     Comprehensive Metabolic Panel; Future  -     Lipid Panel; Future    Other orders  -     POC Glucose    Glucose control & lipids worse. Thyroid & vit D stable.    Try peddling. Do stretching 2x/d.  See eye doctor.  Finish Ozempic 1 mg. Then take 2 mg wkly.  Continue other diabetes, thyroid & lipid meds & vit D supplements.  Call if blood sugars are running over 180.        Jovita Conde MD  01/21/25  22:20 EST

## 2025-02-05 DIAGNOSIS — E11.65 TYPE 2 DIABETES MELLITUS WITH HYPERGLYCEMIA, WITHOUT LONG-TERM CURRENT USE OF INSULIN: ICD-10-CM

## 2025-02-05 RX ORDER — SEMAGLUTIDE 1.34 MG/ML
1 INJECTION, SOLUTION SUBCUTANEOUS WEEKLY
Qty: 1 ML | Refills: 0 | OUTPATIENT
Start: 2025-02-05

## 2025-03-07 ENCOUNTER — TELEPHONE (OUTPATIENT)
Dept: ENDOCRINOLOGY | Facility: CLINIC | Age: 65
End: 2025-03-07
Payer: MEDICARE

## 2025-03-07 NOTE — TELEPHONE ENCOUNTER
Called & left msg for pt:  Definitely stop for at least 2 week, until vomiting ( & nausea ) is over.  Let us know how you do.  We can restart the 1 mg or even 0.25 mg if it is longer than 3 wks.

## 2025-03-07 NOTE — TELEPHONE ENCOUNTER
Pt called and stated her dose was upped from 1mg to 2mg of ozempic and she is throwing up every week when she takes it, pt stated she usually gets nauseous and then throws up. Pt thinks she might need to lower dose back down to the 1mg. Pt stated she is going to dc until she hears something back from provider. Please advise.

## 2025-03-21 DIAGNOSIS — E11.65 TYPE 2 DIABETES MELLITUS WITH HYPERGLYCEMIA, WITHOUT LONG-TERM CURRENT USE OF INSULIN: Primary | ICD-10-CM

## 2025-03-21 RX ORDER — SEMAGLUTIDE 1.34 MG/ML
1 INJECTION, SOLUTION SUBCUTANEOUS WEEKLY
Qty: 9 ML | Refills: 3 | Status: SHIPPED | OUTPATIENT
Start: 2025-03-21

## 2025-04-28 ENCOUNTER — TELEPHONE (OUTPATIENT)
Dept: ENDOCRINOLOGY | Facility: CLINIC | Age: 65
End: 2025-04-28
Payer: MEDICARE

## 2025-04-28 NOTE — TELEPHONE ENCOUNTER
Called & left msg for pt:  Jardiance can increase urination. Usually related to higher bl sugars or infection.  There is no infection & Jardiance is not a new med for her.  Keep well hydrated.  Send bl sugars if they are running high & left us know exactly what you are taking for DM at this time.

## 2025-04-28 NOTE — TELEPHONE ENCOUNTER
PT called and stated she went to a pcp office and they did a urinalysis and it came back clean, pt is concerned because she is going to the restroom every 1 to 2 hrs. Pt is wanting to know if it could be the meds she is on.Please advise

## 2025-05-11 DIAGNOSIS — E11.65 TYPE 2 DIABETES MELLITUS WITH HYPERGLYCEMIA, WITHOUT LONG-TERM CURRENT USE OF INSULIN: ICD-10-CM

## 2025-05-12 RX ORDER — METFORMIN HYDROCHLORIDE 750 MG/1
750 TABLET, EXTENDED RELEASE ORAL
Qty: 90 TABLET | Refills: 3 | Status: SHIPPED | OUTPATIENT
Start: 2025-05-12

## 2025-06-05 ENCOUNTER — TELEPHONE (OUTPATIENT)
Dept: ENDOCRINOLOGY | Facility: CLINIC | Age: 65
End: 2025-06-05
Payer: MEDICARE

## 2025-06-07 DIAGNOSIS — E78.5 HYPERLIPIDEMIA, UNSPECIFIED HYPERLIPIDEMIA TYPE: ICD-10-CM

## 2025-06-09 RX ORDER — FENOFIBRATE 160 MG/1
160 TABLET ORAL
Qty: 90 TABLET | Refills: 3 | Status: SHIPPED | OUTPATIENT
Start: 2025-06-09

## 2025-06-10 DIAGNOSIS — E03.9 HYPOTHYROIDISM, UNSPECIFIED TYPE: ICD-10-CM

## 2025-06-10 RX ORDER — LEVOTHYROXINE SODIUM 200 UG/1
200 TABLET ORAL DAILY
Qty: 90 TABLET | Refills: 2 | Status: SHIPPED | OUTPATIENT
Start: 2025-06-10

## 2025-07-18 DIAGNOSIS — E11.65 TYPE 2 DIABETES MELLITUS WITH HYPERGLYCEMIA, WITHOUT LONG-TERM CURRENT USE OF INSULIN: ICD-10-CM

## 2025-07-18 RX ORDER — EMPAGLIFLOZIN 25 MG/1
25 TABLET, FILM COATED ORAL
Qty: 90 TABLET | Refills: 2 | Status: SHIPPED | OUTPATIENT
Start: 2025-07-18